# Patient Record
Sex: FEMALE | Race: WHITE | NOT HISPANIC OR LATINO | URBAN - METROPOLITAN AREA
[De-identification: names, ages, dates, MRNs, and addresses within clinical notes are randomized per-mention and may not be internally consistent; named-entity substitution may affect disease eponyms.]

---

## 2023-08-22 ENCOUNTER — HOSPITAL ENCOUNTER (OUTPATIENT)
Dept: DATA CONVERSION | Facility: HOSPITAL | Age: 33
Discharge: HOME | End: 2023-08-22
Payer: COMMERCIAL

## 2023-08-22 DIAGNOSIS — O16.1: ICD-10-CM

## 2023-08-22 DIAGNOSIS — Z11.51 ENCOUNTER FOR SCREENING FOR HUMAN PAPILLOMAVIRUS (HPV): ICD-10-CM

## 2023-08-22 DIAGNOSIS — Z3A.09 9 WEEKS GESTATION OF PREGNANCY (HHS-HCC): ICD-10-CM

## 2023-08-22 DIAGNOSIS — Z34.01 ENCOUNTER FOR SUPERVISION OF NORMAL FIRST PREGNANCY, FIRST TRIMESTER (HHS-HCC): ICD-10-CM

## 2023-08-23 LAB
C TRACH RRNA SPEC QL NAA+PROBE: NORMAL
DRUG SCREEN COMMENT UR-IMP: NORMAL
N GONORRHOEA RRNA SPEC QL NAA+PROBE: NORMAL
SPECIMEN SOURCE: NORMAL

## 2023-08-31 ENCOUNTER — HOSPITAL ENCOUNTER (OUTPATIENT)
Dept: DATA CONVERSION | Facility: HOSPITAL | Age: 33
Discharge: HOME | End: 2023-08-31
Payer: COMMERCIAL

## 2023-08-31 DIAGNOSIS — Z11.51 ENCOUNTER FOR SCREENING FOR HUMAN PAPILLOMAVIRUS (HPV): ICD-10-CM

## 2023-08-31 DIAGNOSIS — Z34.01 ENCOUNTER FOR SUPERVISION OF NORMAL FIRST PREGNANCY, FIRST TRIMESTER (HHS-HCC): ICD-10-CM

## 2023-08-31 DIAGNOSIS — Z13.79 ENCOUNTER FOR OTHER SCREENING FOR GENETIC AND CHROMOSOMAL ANOMALIES: ICD-10-CM

## 2023-08-31 LAB
ABO + RH BLD: NORMAL
BACTERIA SPEC CULT: NORMAL
BLD GP AB SCN SERPL QL: NEGATIVE
DEPRECATED RDW RBC AUTO: 44.2 FL (ref 37–54)
ERYTHROCYTE [DISTWIDTH] IN BLOOD BY AUTOMATED COUNT: 13.9 % (ref 11.7–15)
HBA1C MFR BLD: 5.5 % (ref 4–6)
HBV SURFACE AG SER QL: NEGATIVE
HCT VFR BLD AUTO: 39.6 % (ref 36–44)
HGB BLD-MCNC: 13.3 GM/DL (ref 12–15)
HX OF BLOOD TRANSFUSION: NORMAL
MCH RBC QN AUTO: 29.1 PG (ref 26–34)
MCHC RBC AUTO-ENTMCNC: 33.6 % (ref 31–37)
MCV RBC AUTO: 86.7 FL (ref 80–100)
NRBC BLD-RTO: 0 /100 WBC
PLATELET # BLD AUTO: 184 K/UL (ref 150–450)
PMV BLD AUTO: 10.6 CU (ref 7–12.6)
RBC # BLD AUTO: 4.57 M/UL (ref 4–4.9)
REPORT STATUS -LH SQ DATA CONVERSION: NORMAL
RUBV IGG SER-ACNC: 95.06 IU/ML
SERVICE CMNT-IMP: NORMAL
SPECIMEN SOURCE: NORMAL
TSH SERPL DL<=0.05 MIU/L-ACNC: 1.43 MIU/L (ref 0.27–4.2)
WBC # BLD AUTO: 7 K/UL (ref 4.5–11)

## 2023-09-01 LAB
HCV AB SER QL: NORMAL
HIV 1+2 AB+HIV1 P24 AG SERPL QL IA: NORMAL

## 2023-09-04 LAB — RPR SER QL: NONREACTIVE

## 2023-09-27 ENCOUNTER — HOSPITAL ENCOUNTER (OUTPATIENT)
Dept: DATA CONVERSION | Facility: HOSPITAL | Age: 33
Discharge: HOME | End: 2023-09-27
Payer: COMMERCIAL

## 2023-09-27 DIAGNOSIS — Z34.82 ENCOUNTER FOR SUPERVISION OF OTHER NORMAL PREGNANCY, SECOND TRIMESTER (HHS-HCC): ICD-10-CM

## 2023-09-27 LAB
ALT SERPL-CCNC: 47 U/L (ref 5–40)
AST SERPL-CCNC: 29 U/L (ref 5–40)
BUN SERPL-MCNC: 10 MG/DL (ref 8–25)
BUN/CREAT SERPL: 12.5 RATIO (ref 8–21)
CREAT SERPL-MCNC: 0.8 MG/DL (ref 0.4–1.6)
CREAT UR-MCNC: 254.5 MG/DL
DEPRECATED RDW RBC AUTO: 44.6 FL (ref 37–54)
ERYTHROCYTE [DISTWIDTH] IN BLOOD BY AUTOMATED COUNT: 14.2 % (ref 11.7–15)
GFR SERPL CREATININE-BSD FRML MDRD: 101 ML/MIN/1.73 M2
HCT VFR BLD AUTO: 40.5 % (ref 36–44)
HGB BLD-MCNC: 13.4 GM/DL (ref 12–15)
LDH SERPL-CCNC: 158 U/L (ref 91–227)
MCH RBC QN AUTO: 28.6 PG (ref 26–34)
MCHC RBC AUTO-ENTMCNC: 33.1 % (ref 31–37)
MCV RBC AUTO: 86.4 FL (ref 80–100)
NRBC BLD-RTO: 0 /100 WBC
PLATELET # BLD AUTO: 219 K/UL (ref 150–450)
PMV BLD AUTO: 10.8 CU (ref 7–12.6)
PROT UR-MCNC: 28 MG/DL
PROT/CREAT UR: 0.1 MG/G
RBC # BLD AUTO: 4.69 M/UL (ref 4–4.9)
URATE SERPL-MCNC: 5.4 MG/DL (ref 2.5–6.8)
WBC # BLD AUTO: 11.7 K/UL (ref 4.5–11)

## 2023-12-14 ENCOUNTER — LAB (OUTPATIENT)
Dept: LAB | Facility: LAB | Age: 33
End: 2023-12-14
Payer: COMMERCIAL

## 2023-12-14 DIAGNOSIS — Z34.02 ENCOUNTER FOR SUPERVISION OF NORMAL FIRST PREGNANCY, SECOND TRIMESTER (HHS-HCC): Primary | ICD-10-CM

## 2023-12-14 LAB — GLUCOSE 1H P GLC SERPL-MCNC: 102 MG/DL (ref 65–139)

## 2023-12-14 PROCEDURE — 82947 ASSAY GLUCOSE BLOOD QUANT: CPT

## 2023-12-14 PROCEDURE — 36415 COLL VENOUS BLD VENIPUNCTURE: CPT

## 2023-12-15 ENCOUNTER — LAB (OUTPATIENT)
Dept: LAB | Facility: LAB | Age: 33
End: 2023-12-15
Payer: COMMERCIAL

## 2023-12-15 DIAGNOSIS — O10.919 HTN IN PREGNANCY, CHRONIC (HHS-HCC): Primary | ICD-10-CM

## 2023-12-15 DIAGNOSIS — O10.919 HTN IN PREGNANCY, CHRONIC (HHS-HCC): ICD-10-CM

## 2023-12-15 LAB
ERYTHROCYTE [DISTWIDTH] IN BLOOD BY AUTOMATED COUNT: 14.1 % (ref 11.5–14.5)
HCT VFR BLD AUTO: 37.6 % (ref 36–46)
HGB BLD-MCNC: 11.8 G/DL (ref 12–16)
MCH RBC QN AUTO: 29.1 PG (ref 26–34)
MCHC RBC AUTO-ENTMCNC: 31.4 G/DL (ref 32–36)
MCV RBC AUTO: 93 FL (ref 80–100)
NRBC BLD-RTO: 0 /100 WBCS (ref 0–0)
PLATELET # BLD AUTO: 202 X10*3/UL (ref 150–450)
RBC # BLD AUTO: 4.06 X10*6/UL (ref 4–5.2)
WBC # BLD AUTO: 12.7 X10*3/UL (ref 4.4–11.3)

## 2023-12-15 PROCEDURE — 36415 COLL VENOUS BLD VENIPUNCTURE: CPT

## 2023-12-15 PROCEDURE — 85027 COMPLETE CBC AUTOMATED: CPT

## 2023-12-22 ENCOUNTER — LAB (OUTPATIENT)
Dept: LAB | Facility: LAB | Age: 33
End: 2023-12-22
Payer: COMMERCIAL

## 2023-12-22 DIAGNOSIS — O16.3 UNSPECIFIED MATERNAL HYPERTENSION, THIRD TRIMESTER (HHS-HCC): Primary | ICD-10-CM

## 2023-12-22 LAB
ALT SERPL-CCNC: 56 U/L (ref 5–40)
AST SERPL-CCNC: 40 U/L (ref 5–40)
BUN SERPL-MCNC: 8 MG/DL (ref 8–25)
CREAT SERPL-MCNC: 0.6 MG/DL (ref 0.4–1.6)
CREAT UR-MCNC: 63.6 MG/DL
ERYTHROCYTE [DISTWIDTH] IN BLOOD BY AUTOMATED COUNT: 14.3 % (ref 11.5–14.5)
GFR SERPL CREATININE-BSD FRML MDRD: >90 ML/MIN/1.73M*2
HCT VFR BLD AUTO: 36.9 % (ref 36–46)
HGB BLD-MCNC: 11.7 G/DL (ref 12–16)
LDH SERPL-CCNC: 136 U/L (ref 91–227)
MCH RBC QN AUTO: 29.2 PG (ref 26–34)
MCHC RBC AUTO-ENTMCNC: 31.7 G/DL (ref 32–36)
MCV RBC AUTO: 92 FL (ref 80–100)
NRBC BLD-RTO: 0 /100 WBCS (ref 0–0)
PLATELET # BLD AUTO: 176 X10*3/UL (ref 150–450)
PROT UR-MCNC: 10 MG/DL
PROT/CREAT UR: 0.16 MG/MG CREAT
RBC # BLD AUTO: 4.01 X10*6/UL (ref 4–5.2)
URATE SERPL-MCNC: 4.5 MG/DL (ref 2.5–6.8)
WBC # BLD AUTO: 11.7 X10*3/UL (ref 4.4–11.3)

## 2023-12-22 PROCEDURE — 36415 COLL VENOUS BLD VENIPUNCTURE: CPT

## 2023-12-22 PROCEDURE — 84450 TRANSFERASE (AST) (SGOT): CPT

## 2023-12-22 PROCEDURE — 84156 ASSAY OF PROTEIN URINE: CPT

## 2023-12-22 PROCEDURE — 83615 LACTATE (LD) (LDH) ENZYME: CPT

## 2023-12-22 PROCEDURE — 82570 ASSAY OF URINE CREATININE: CPT

## 2023-12-22 PROCEDURE — 82565 ASSAY OF CREATININE: CPT

## 2023-12-22 PROCEDURE — 85027 COMPLETE CBC AUTOMATED: CPT

## 2023-12-22 PROCEDURE — 84520 ASSAY OF UREA NITROGEN: CPT

## 2023-12-22 PROCEDURE — 84550 ASSAY OF BLOOD/URIC ACID: CPT

## 2023-12-22 PROCEDURE — 84460 ALANINE AMINO (ALT) (SGPT): CPT

## 2024-01-08 ENCOUNTER — LAB (OUTPATIENT)
Dept: LAB | Facility: LAB | Age: 34
End: 2024-01-08
Payer: COMMERCIAL

## 2024-01-08 ENCOUNTER — HOSPITAL ENCOUNTER (OUTPATIENT)
Facility: HOSPITAL | Age: 34
End: 2024-01-08
Attending: OBSTETRICS & GYNECOLOGY | Admitting: OBSTETRICS & GYNECOLOGY
Payer: COMMERCIAL

## 2024-01-08 ENCOUNTER — HOSPITAL ENCOUNTER (OUTPATIENT)
Facility: HOSPITAL | Age: 34
Discharge: HOME | End: 2024-01-08
Attending: OBSTETRICS & GYNECOLOGY | Admitting: OBSTETRICS & GYNECOLOGY
Payer: COMMERCIAL

## 2024-01-08 VITALS
BODY MASS INDEX: 49.46 KG/M2 | DIASTOLIC BLOOD PRESSURE: 57 MMHG | RESPIRATION RATE: 20 BRPM | OXYGEN SATURATION: 98 % | WEIGHT: 289.68 LBS | HEIGHT: 64 IN | SYSTOLIC BLOOD PRESSURE: 98 MMHG | TEMPERATURE: 97.9 F | HEART RATE: 89 BPM

## 2024-01-08 DIAGNOSIS — O16.3 UNSPECIFIED MATERNAL HYPERTENSION, THIRD TRIMESTER (HHS-HCC): Primary | ICD-10-CM

## 2024-01-08 LAB
ALBUMIN SERPL BCP-MCNC: 3.6 G/DL (ref 3.4–5)
ALP SERPL-CCNC: 97 U/L (ref 33–110)
ALT SERPL W P-5'-P-CCNC: 71 U/L (ref 7–45)
ALT SERPL-CCNC: 73 U/L (ref 5–40)
ANION GAP SERPL CALC-SCNC: 15 MMOL/L (ref 10–20)
AST SERPL W P-5'-P-CCNC: 42 U/L (ref 9–39)
AST SERPL-CCNC: 48 U/L (ref 5–40)
BILIRUB SERPL-MCNC: 0.5 MG/DL (ref 0–1.2)
BILIRUBIN, POC: NEGATIVE
BLOOD URINE, POC: NEGATIVE
BUN SERPL-MCNC: 7 MG/DL (ref 6–23)
BUN SERPL-MCNC: 7 MG/DL (ref 8–25)
CALCIUM SERPL-MCNC: 9.8 MG/DL (ref 8.6–10.6)
CHLORIDE SERPL-SCNC: 104 MMOL/L (ref 98–107)
CLARITY, POC: CLEAR
CO2 SERPL-SCNC: 21 MMOL/L (ref 21–32)
COLOR, POC: YELLOW
CREAT SERPL-MCNC: 0.44 MG/DL (ref 0.5–1.05)
CREAT SERPL-MCNC: 0.6 MG/DL (ref 0.4–1.6)
CREAT UR-MCNC: 25.9 MG/DL (ref 20–320)
CREAT UR-MCNC: 36.4 MG/DL
EGFRCR SERPLBLD CKD-EPI 2021: >90 ML/MIN/1.73M*2
EGFRCR SERPLBLD CKD-EPI 2021: >90 ML/MIN/1.73M*2
ERYTHROCYTE [DISTWIDTH] IN BLOOD BY AUTOMATED COUNT: 14.3 % (ref 11.5–14.5)
ERYTHROCYTE [DISTWIDTH] IN BLOOD BY AUTOMATED COUNT: 14.6 % (ref 11.5–14.5)
GLUCOSE SERPL-MCNC: 90 MG/DL (ref 74–99)
GLUCOSE URINE, POC: NEGATIVE
HAV IGM SER QL: NONREACTIVE
HBV CORE IGM SER QL: NONREACTIVE
HBV SURFACE AG SERPL QL IA: NONREACTIVE
HCT VFR BLD AUTO: 35.6 % (ref 36–46)
HCT VFR BLD AUTO: 37.5 % (ref 36–46)
HCV AB SER QL: NONREACTIVE
HGB BLD-MCNC: 11.6 G/DL (ref 12–16)
HGB BLD-MCNC: 11.7 G/DL (ref 12–16)
KETONES, POC: NEGATIVE
LDH SERPL L TO P-CCNC: 122 U/L (ref 84–246)
LEUKOCYTE EST, POC: NEGATIVE
MCH RBC QN AUTO: 29.4 PG (ref 26–34)
MCH RBC QN AUTO: 29.5 PG (ref 26–34)
MCHC RBC AUTO-ENTMCNC: 30.9 G/DL (ref 32–36)
MCHC RBC AUTO-ENTMCNC: 32.9 G/DL (ref 32–36)
MCV RBC AUTO: 90 FL (ref 80–100)
MCV RBC AUTO: 95 FL (ref 80–100)
NITRITE, POC: NEGATIVE
NRBC BLD-RTO: 0 /100 WBCS (ref 0–0)
NRBC BLD-RTO: 0 /100 WBCS (ref 0–0)
PH, POC: 6.5
PLATELET # BLD AUTO: 160 X10*3/UL (ref 150–450)
PLATELET # BLD AUTO: 165 X10*3/UL (ref 150–450)
POTASSIUM SERPL-SCNC: 3.9 MMOL/L (ref 3.5–5.3)
PROT SERPL-MCNC: 6.7 G/DL (ref 6.4–8.2)
PROT UR-ACNC: 7 MG/DL (ref 5–24)
PROT UR-MCNC: 7 MG/DL
PROT/CREAT UR: 0.27 MG/MG CREAT (ref 0–0.17)
PROT/CREAT UR: NORMAL MG/G{CREAT}
RBC # BLD AUTO: 3.95 X10*6/UL (ref 4–5.2)
RBC # BLD AUTO: 3.97 X10*6/UL (ref 4–5.2)
SODIUM SERPL-SCNC: 136 MMOL/L (ref 136–145)
SPECIFIC GRAVITY, POC: 1.01
URATE SERPL-MCNC: 3.9 MG/DL (ref 2.5–6.8)
URATE SERPL-MCNC: 4.1 MG/DL (ref 2.3–6.7)
URINE PROTEIN, POC: NORMAL
UROBILINOGEN, POC: NEGATIVE
WBC # BLD AUTO: 10.4 X10*3/UL (ref 4.4–11.3)
WBC # BLD AUTO: 9.6 X10*3/UL (ref 4.4–11.3)

## 2024-01-08 PROCEDURE — 84156 ASSAY OF PROTEIN URINE: CPT

## 2024-01-08 PROCEDURE — 85027 COMPLETE CBC AUTOMATED: CPT

## 2024-01-08 PROCEDURE — 80074 ACUTE HEPATITIS PANEL: CPT

## 2024-01-08 PROCEDURE — 36415 COLL VENOUS BLD VENIPUNCTURE: CPT

## 2024-01-08 PROCEDURE — 80053 COMPREHEN METABOLIC PANEL: CPT

## 2024-01-08 PROCEDURE — 82239 BILE ACIDS TOTAL: CPT

## 2024-01-08 PROCEDURE — 2500000001 HC RX 250 WO HCPCS SELF ADMINISTERED DRUGS (ALT 637 FOR MEDICARE OP)

## 2024-01-08 PROCEDURE — 99215 OFFICE O/P EST HI 40 MIN: CPT | Mod: 25

## 2024-01-08 PROCEDURE — 84550 ASSAY OF BLOOD/URIC ACID: CPT

## 2024-01-08 PROCEDURE — 81002 URINALYSIS NONAUTO W/O SCOPE: CPT

## 2024-01-08 PROCEDURE — 82565 ASSAY OF CREATININE: CPT

## 2024-01-08 PROCEDURE — 59025 FETAL NON-STRESS TEST: CPT

## 2024-01-08 PROCEDURE — 83615 LACTATE (LD) (LDH) ENZYME: CPT

## 2024-01-08 PROCEDURE — 99213 OFFICE O/P EST LOW 20 MIN: CPT | Mod: 25

## 2024-01-08 PROCEDURE — 99214 OFFICE O/P EST MOD 30 MIN: CPT

## 2024-01-08 RX ORDER — DIPHENHYDRAMINE HCL 25 MG
25 CAPSULE ORAL ONCE
Status: COMPLETED | OUTPATIENT
Start: 2024-01-08 | End: 2024-01-08

## 2024-01-08 RX ORDER — METOCLOPRAMIDE 10 MG/1
10 TABLET ORAL EVERY 6 HOURS PRN
Status: DISCONTINUED | OUTPATIENT
Start: 2024-01-08 | End: 2024-01-08 | Stop reason: HOSPADM

## 2024-01-08 RX ORDER — ONDANSETRON 4 MG/1
4 TABLET, FILM COATED ORAL EVERY 6 HOURS PRN
Status: DISCONTINUED | OUTPATIENT
Start: 2024-01-08 | End: 2024-01-08 | Stop reason: HOSPADM

## 2024-01-08 RX ORDER — DIPHENHYDRAMINE HYDROCHLORIDE 50 MG/ML
25 INJECTION INTRAMUSCULAR; INTRAVENOUS ONCE
Status: COMPLETED | OUTPATIENT
Start: 2024-01-08 | End: 2024-01-08

## 2024-01-08 RX ORDER — METOCLOPRAMIDE HYDROCHLORIDE 5 MG/ML
10 INJECTION INTRAMUSCULAR; INTRAVENOUS EVERY 6 HOURS PRN
Status: DISCONTINUED | OUTPATIENT
Start: 2024-01-08 | End: 2024-01-08 | Stop reason: HOSPADM

## 2024-01-08 RX ORDER — ONDANSETRON HYDROCHLORIDE 2 MG/ML
4 INJECTION, SOLUTION INTRAVENOUS EVERY 6 HOURS PRN
Status: DISCONTINUED | OUTPATIENT
Start: 2024-01-08 | End: 2024-01-08 | Stop reason: HOSPADM

## 2024-01-08 RX ORDER — ACETAMINOPHEN 325 MG/1
975 TABLET ORAL ONCE
Status: COMPLETED | OUTPATIENT
Start: 2024-01-08 | End: 2024-01-08

## 2024-01-08 RX ADMIN — ACETAMINOPHEN 975 MG: 325 TABLET ORAL at 18:54

## 2024-01-08 RX ADMIN — DIPHENHYDRAMINE HYDROCHLORIDE 25 MG: 25 CAPSULE ORAL at 18:54

## 2024-01-08 ASSESSMENT — PAIN SCALES - GENERAL: PAINLEVEL_OUTOF10: 0 - NO PAIN

## 2024-01-09 NOTE — H&P
Obstetrical Admission History and Physical  Triage   Nory Rao is a 33 y.o.  at 28w6d     Chief Complaint: Headache, Elevated LFTs, and Visual Field Change    Assessment/Plan    Elevated LFTs  - ALT: 56 () --> 73 () --> 71 ()  - AST: 40 () --> 48 () --> 42 ()  - Stable on repeat 6hr draw today  - CBC unremarkable, P:C 0.27, Bps normotensive, unlikely siPEC at this time  - Denies RUQ pain or previous liver etiologies   - Denies excessive OTC use, x1 dose of 1g tylenol one day ago  - Hepatitis B and C WNL on prenatal panel  - Hepatitis panel pending  - Notes increased intense itching on soles and palms last few days  - Bile acids pending  - Will follow-up results    cHTN  - BPs normotensive in triage  - Dull GIRON, improved with tylenol, reglan, benadryl  - HELLP labs notable for elevated LFTs (see above)  - P:C 0.27  - On labetalol 200mg BID  - Continue to take BPs at home  - Reviewed signs elevated BP, appropriate BP parameters and how to monitor BP at home   - Low suspicion at this time for developing HELLP syndrome given normotensive Bps and otherwise unremarkable labwork    IUP at 28w6d   - NST appropriate for gestational age   - Variable decel noted, cat I on prolonged monitoring >2hrs  - Good fetal movement  - Continue routine prenatal care  - Precautions to return discussed     Maternal Well-being  - Vital signs stable and WNL  - All questions and concerns addressed     Plan and tracing reviewed with Dr. Valera.  Patient safe and stable for d/c home.     Caitlyn Guan, JORGE ALBERTO-CNP     Active Problems:  There are no active Hospital Problems.      Pregnancy Problems (from 26 to present)       No problems associated with this episode.          Subjective   Nory is here for elevated liver enzymes.  Notes that lab work revealed elevated LFTs prior to paty.  Today were increasing.  Patient denies RUQ pain, nausea, vomiting.  Reports mild headache, 3/10.  Took tylenol  yesterday with benadryl and got no relief.  Reports occasional floater in eyes unilaterally while driving.  Denies history of liver etiologies including fatty liver, hepatomegaly, or hepatitis.  Denies excessive OTC drug intake.  Denies taking any medications during the pregnancy.  Reports new onset URI symptoms that started two days ago.  Endorses itching of the soles of her feet and palms of her hands that started a few days ago as well.  Itching it worse at night.     Obstetrical History   OB History    Para Term  AB Living   1             SAB IAB Ectopic Multiple Live Births                  # Outcome Date GA Lbr Reji/2nd Weight Sex Delivery Anes PTL Lv   1 Current                Past Medical History  No past medical history on file.     Past Surgical History   No past surgical history on file.    Social History  Social History     Tobacco Use    Smoking status: Not on file    Smokeless tobacco: Not on file   Substance Use Topics    Alcohol use: Not on file     Substance and Sexual Activity   Drug Use Not on file       Allergies  Patient has no known allergies.     Medications  No medications prior to admission.       Objective    Last Vitals  Temp Pulse Resp BP MAP O2 Sat   36.6 °C (97.9 °F) 89 20 98/57   98 %     Physical Examination  Physical Exam  Exam conducted with a chaperone present.   Constitutional:       Appearance: Normal appearance.   HENT:      Head: Normocephalic.   Cardiovascular:      Rate and Rhythm: Normal rate.      Pulses: Normal pulses.   Pulmonary:      Effort: Pulmonary effort is normal.   Abdominal:      General: Abdomen is flat.      Palpations: Abdomen is soft.      Comments: Gravid   Genitourinary:     Comments: FHT: 145, mod variability, +accels, variable decel noted  [place]: quiet  Musculoskeletal:         General: Normal range of motion.   Skin:     General: Skin is warm.   Neurological:      Mental Status: She is alert and oriented to person, place, and time.    Psychiatric:         Mood and Affect: Mood normal.         Behavior: Behavior normal.        Lab Review  Lab Results   Component Value Date    WBC 10.4 01/08/2024    HGB 11.7 (L) 01/08/2024    HCT 35.6 (L) 01/08/2024     01/08/2024     Lab Results   Component Value Date    GLUCOSE 90 01/08/2024     01/08/2024    K 3.9 01/08/2024     01/08/2024    CO2 21 01/08/2024    ANIONGAP 15 01/08/2024    BUN 7 01/08/2024    CREATININE 0.44 (L) 01/08/2024    EGFR >90 01/08/2024    CALCIUM 9.8 01/08/2024    ALBUMIN 3.6 01/08/2024    PROT 6.7 01/08/2024    ALKPHOS 97 01/08/2024    ALT 71 (H) 01/08/2024    AST 42 (H) 01/08/2024    BILITOT 0.5 01/08/2024     Lab Results   Component Value Date    UTPCR 0.27 (H) 01/08/2024

## 2024-01-10 LAB — BILE AC SERPL-SCNC: 7 UMOL/L (ref 0–10)

## 2024-01-30 ENCOUNTER — LAB (OUTPATIENT)
Dept: LAB | Facility: LAB | Age: 34
End: 2024-01-30
Payer: COMMERCIAL

## 2024-01-30 DIAGNOSIS — O16.3 UNSPECIFIED MATERNAL HYPERTENSION, THIRD TRIMESTER (HHS-HCC): Primary | ICD-10-CM

## 2024-01-30 LAB
ALT SERPL-CCNC: 52 U/L (ref 5–40)
AST SERPL-CCNC: 39 U/L (ref 5–40)
BUN SERPL-MCNC: 8 MG/DL (ref 8–25)
CREAT SERPL-MCNC: 0.6 MG/DL (ref 0.4–1.6)
CREAT UR-MCNC: 74.9 MG/DL
EGFRCR SERPLBLD CKD-EPI 2021: >90 ML/MIN/1.73M*2
ERYTHROCYTE [DISTWIDTH] IN BLOOD BY AUTOMATED COUNT: 14.2 % (ref 11.5–14.5)
HCT VFR BLD AUTO: 38.4 % (ref 36–46)
HGB BLD-MCNC: 12.4 G/DL (ref 12–16)
MCH RBC QN AUTO: 28.9 PG (ref 26–34)
MCHC RBC AUTO-ENTMCNC: 32.3 G/DL (ref 32–36)
MCV RBC AUTO: 90 FL (ref 80–100)
NRBC BLD-RTO: 0 /100 WBCS (ref 0–0)
PLATELET # BLD AUTO: 171 X10*3/UL (ref 150–450)
PROT UR-MCNC: 13 MG/DL
PROT/CREAT UR: 0.17 MG/MG CREAT
RBC # BLD AUTO: 4.29 X10*6/UL (ref 4–5.2)
URATE SERPL-MCNC: 4.4 MG/DL (ref 2.5–6.8)
WBC # BLD AUTO: 10.6 X10*3/UL (ref 4.4–11.3)

## 2024-01-30 PROCEDURE — 84450 TRANSFERASE (AST) (SGOT): CPT

## 2024-01-30 PROCEDURE — 82565 ASSAY OF CREATININE: CPT

## 2024-01-30 PROCEDURE — 85027 COMPLETE CBC AUTOMATED: CPT

## 2024-01-30 PROCEDURE — 84550 ASSAY OF BLOOD/URIC ACID: CPT

## 2024-01-30 PROCEDURE — 82570 ASSAY OF URINE CREATININE: CPT

## 2024-01-30 PROCEDURE — 84156 ASSAY OF PROTEIN URINE: CPT

## 2024-01-30 PROCEDURE — 36415 COLL VENOUS BLD VENIPUNCTURE: CPT

## 2024-01-30 PROCEDURE — 84460 ALANINE AMINO (ALT) (SGPT): CPT

## 2024-01-30 PROCEDURE — 84520 ASSAY OF UREA NITROGEN: CPT

## 2024-02-07 ENCOUNTER — LAB (OUTPATIENT)
Dept: LAB | Facility: LAB | Age: 34
End: 2024-02-07
Payer: COMMERCIAL

## 2024-02-07 DIAGNOSIS — Z34.83 ENCOUNTER FOR SUPERVISION OF OTHER NORMAL PREGNANCY, THIRD TRIMESTER (HHS-HCC): Primary | ICD-10-CM

## 2024-02-07 DIAGNOSIS — O12.03 GESTATIONAL EDEMA, THIRD TRIMESTER (HHS-HCC): ICD-10-CM

## 2024-02-07 DIAGNOSIS — L29.9 PRURITUS, UNSPECIFIED: ICD-10-CM

## 2024-02-07 DIAGNOSIS — I10 ESSENTIAL (PRIMARY) HYPERTENSION: ICD-10-CM

## 2024-02-07 LAB
ALT SERPL-CCNC: 52 U/L (ref 5–40)
AST SERPL-CCNC: 39 U/L (ref 5–40)
BUN SERPL-MCNC: 8 MG/DL (ref 8–25)
CREAT SERPL-MCNC: 0.7 MG/DL (ref 0.4–1.6)
CREAT UR-MCNC: 149.9 MG/DL
EGFRCR SERPLBLD CKD-EPI 2021: >90 ML/MIN/1.73M*2
ERYTHROCYTE [DISTWIDTH] IN BLOOD BY AUTOMATED COUNT: 14.5 % (ref 11.5–14.5)
HCT VFR BLD AUTO: 37 % (ref 36–46)
HGB BLD-MCNC: 12.2 G/DL (ref 12–16)
LDH SERPL-CCNC: 158 U/L (ref 91–227)
MCH RBC QN AUTO: 28.8 PG (ref 26–34)
MCHC RBC AUTO-ENTMCNC: 33 G/DL (ref 32–36)
MCV RBC AUTO: 87 FL (ref 80–100)
NRBC BLD-RTO: 0.2 /100 WBCS (ref 0–0)
PLATELET # BLD AUTO: 158 X10*3/UL (ref 150–450)
PROT UR-MCNC: 34 MG/DL
PROT/CREAT UR: 0.23 MG/MG CREAT
RBC # BLD AUTO: 4.24 X10*6/UL (ref 4–5.2)
URATE SERPL-MCNC: 6.2 MG/DL (ref 2.5–6.8)
WBC # BLD AUTO: 8.5 X10*3/UL (ref 4.4–11.3)

## 2024-02-07 PROCEDURE — 84550 ASSAY OF BLOOD/URIC ACID: CPT

## 2024-02-07 PROCEDURE — 84460 ALANINE AMINO (ALT) (SGPT): CPT

## 2024-02-07 PROCEDURE — 84156 ASSAY OF PROTEIN URINE: CPT

## 2024-02-07 PROCEDURE — 36415 COLL VENOUS BLD VENIPUNCTURE: CPT

## 2024-02-07 PROCEDURE — 82570 ASSAY OF URINE CREATININE: CPT

## 2024-02-07 PROCEDURE — 84450 TRANSFERASE (AST) (SGOT): CPT

## 2024-02-07 PROCEDURE — 82565 ASSAY OF CREATININE: CPT

## 2024-02-07 PROCEDURE — 83615 LACTATE (LD) (LDH) ENZYME: CPT

## 2024-02-07 PROCEDURE — 84520 ASSAY OF UREA NITROGEN: CPT

## 2024-02-07 PROCEDURE — 85027 COMPLETE CBC AUTOMATED: CPT

## 2024-02-09 ENCOUNTER — HOSPITAL ENCOUNTER (OUTPATIENT)
Facility: HOSPITAL | Age: 34
Discharge: ANOTHER HOSPITAL | End: 2024-02-09
Attending: OBSTETRICS & GYNECOLOGY | Admitting: OBSTETRICS & GYNECOLOGY
Payer: COMMERCIAL

## 2024-02-09 VITALS
DIASTOLIC BLOOD PRESSURE: 74 MMHG | RESPIRATION RATE: 18 BRPM | SYSTOLIC BLOOD PRESSURE: 149 MMHG | WEIGHT: 289.68 LBS | HEIGHT: 64 IN | HEART RATE: 78 BPM | OXYGEN SATURATION: 96 % | TEMPERATURE: 98.1 F | BODY MASS INDEX: 49.46 KG/M2

## 2024-02-09 LAB
ALBUMIN SERPL-MCNC: 3.2 G/DL (ref 3.5–5)
ALP BLD-CCNC: 132 U/L (ref 35–125)
ALT SERPL-CCNC: 53 U/L (ref 5–40)
ANION GAP SERPL CALC-SCNC: 12 MMOL/L
AST SERPL-CCNC: 45 U/L (ref 5–40)
BILIRUB SERPL-MCNC: 0.4 MG/DL (ref 0.1–1.2)
BILIRUBIN, POC: NEGATIVE
BLOOD URINE, POC: NEGATIVE
BUN SERPL-MCNC: 6 MG/DL (ref 8–25)
CALCIUM SERPL-MCNC: 9.6 MG/DL (ref 8.5–10.4)
CHLORIDE SERPL-SCNC: 104 MMOL/L (ref 97–107)
CLARITY, POC: CLEAR
CO2 SERPL-SCNC: 20 MMOL/L (ref 24–31)
COLOR, POC: YELLOW
CREAT SERPL-MCNC: 0.6 MG/DL (ref 0.4–1.6)
CREAT UR-MCNC: 21.5 MG/DL
EGFRCR SERPLBLD CKD-EPI 2021: >90 ML/MIN/1.73M*2
ERYTHROCYTE [DISTWIDTH] IN BLOOD BY AUTOMATED COUNT: 14.4 % (ref 11.5–14.5)
GLUCOSE SERPL-MCNC: 109 MG/DL (ref 65–99)
GLUCOSE URINE, POC: NORMAL
HCT VFR BLD AUTO: 33.9 % (ref 36–46)
HGB BLD-MCNC: 11.3 G/DL (ref 12–16)
KETONES, POC: NEGATIVE
LEUKOCYTE EST, POC: NEGATIVE
MCH RBC QN AUTO: 29.4 PG (ref 26–34)
MCHC RBC AUTO-ENTMCNC: 33.3 G/DL (ref 32–36)
MCV RBC AUTO: 88 FL (ref 80–100)
NITRITE, POC: NEGATIVE
NRBC BLD-RTO: 0.2 /100 WBCS (ref 0–0)
PH, POC: 6
PLATELET # BLD AUTO: 144 X10*3/UL (ref 150–450)
POC APPEARANCE OF BODY FLUID: CLEAR
POTASSIUM SERPL-SCNC: 3.9 MMOL/L (ref 3.4–5.1)
PROT SERPL-MCNC: 6.5 G/DL (ref 5.9–7.9)
PROT UR-MCNC: 6 MG/DL
PROT/CREAT UR: NORMAL MG/G{CREAT}
RBC # BLD AUTO: 3.84 X10*6/UL (ref 4–5.2)
SODIUM SERPL-SCNC: 136 MMOL/L (ref 133–145)
SPECIFIC GRAVITY, POC: 1
URATE SERPL-MCNC: 5.1 MG/DL (ref 2.5–6.8)
URINE PROTEIN, POC: NEGATIVE
UROBILINOGEN, POC: NORMAL
WBC # BLD AUTO: 9.6 X10*3/UL (ref 4.4–11.3)

## 2024-02-09 PROCEDURE — 2500000001 HC RX 250 WO HCPCS SELF ADMINISTERED DRUGS (ALT 637 FOR MEDICARE OP): Performed by: OBSTETRICS & GYNECOLOGY

## 2024-02-09 PROCEDURE — 83615 LACTATE (LD) (LDH) ENZYME: CPT | Performed by: OBSTETRICS & GYNECOLOGY

## 2024-02-09 PROCEDURE — 81002 URINALYSIS NONAUTO W/O SCOPE: CPT | Performed by: OBSTETRICS & GYNECOLOGY

## 2024-02-09 PROCEDURE — 85027 COMPLETE CBC AUTOMATED: CPT | Performed by: OBSTETRICS & GYNECOLOGY

## 2024-02-09 PROCEDURE — 36415 COLL VENOUS BLD VENIPUNCTURE: CPT

## 2024-02-09 PROCEDURE — 36415 COLL VENOUS BLD VENIPUNCTURE: CPT | Performed by: OBSTETRICS & GYNECOLOGY

## 2024-02-09 PROCEDURE — 84550 ASSAY OF BLOOD/URIC ACID: CPT | Performed by: OBSTETRICS & GYNECOLOGY

## 2024-02-09 PROCEDURE — 82570 ASSAY OF URINE CREATININE: CPT | Performed by: OBSTETRICS & GYNECOLOGY

## 2024-02-09 PROCEDURE — 99215 OFFICE O/P EST HI 40 MIN: CPT | Mod: 25,27

## 2024-02-09 PROCEDURE — 96372 THER/PROPH/DIAG INJ SC/IM: CPT

## 2024-02-09 PROCEDURE — 2500000004 HC RX 250 GENERAL PHARMACY W/ HCPCS (ALT 636 FOR OP/ED): Performed by: OBSTETRICS & GYNECOLOGY

## 2024-02-09 PROCEDURE — 80053 COMPREHEN METABOLIC PANEL: CPT | Performed by: OBSTETRICS & GYNECOLOGY

## 2024-02-09 RX ORDER — ACETAMINOPHEN 325 MG/1
975 TABLET ORAL EVERY 6 HOURS PRN
Status: DISCONTINUED | OUTPATIENT
Start: 2024-02-09 | End: 2024-02-10 | Stop reason: HOSPADM

## 2024-02-09 RX ORDER — LABETALOL 200 MG/1
200 TABLET, FILM COATED ORAL ONCE
COMMUNITY
End: 2024-02-16 | Stop reason: HOSPADM

## 2024-02-09 RX ORDER — LABETALOL 100 MG/1
400 TABLET, FILM COATED ORAL 2 TIMES DAILY
Status: DISCONTINUED | OUTPATIENT
Start: 2024-02-09 | End: 2024-02-10 | Stop reason: HOSPADM

## 2024-02-09 RX ORDER — HYDRALAZINE HYDROCHLORIDE 20 MG/ML
5 INJECTION INTRAMUSCULAR; INTRAVENOUS ONCE AS NEEDED
Status: DISCONTINUED | OUTPATIENT
Start: 2024-02-09 | End: 2024-02-10 | Stop reason: HOSPADM

## 2024-02-09 RX ORDER — LABETALOL HYDROCHLORIDE 5 MG/ML
20 INJECTION, SOLUTION INTRAVENOUS ONCE AS NEEDED
Status: COMPLETED | OUTPATIENT
Start: 2024-02-09 | End: 2024-02-09

## 2024-02-09 RX ORDER — CALCIUM GLUCONATE 98 MG/ML
1 INJECTION, SOLUTION INTRAVENOUS ONCE AS NEEDED
Status: DISCONTINUED | OUTPATIENT
Start: 2024-02-09 | End: 2024-02-10 | Stop reason: HOSPADM

## 2024-02-09 RX ORDER — LIDOCAINE HYDROCHLORIDE 10 MG/ML
0.5 INJECTION INFILTRATION; PERINEURAL ONCE AS NEEDED
Status: DISCONTINUED | OUTPATIENT
Start: 2024-02-09 | End: 2024-02-10 | Stop reason: HOSPADM

## 2024-02-09 RX ORDER — ONDANSETRON HYDROCHLORIDE 2 MG/ML
4 INJECTION, SOLUTION INTRAVENOUS ONCE
Status: COMPLETED | OUTPATIENT
Start: 2024-02-09 | End: 2024-02-09

## 2024-02-09 RX ORDER — NIFEDIPINE 10 MG/1
10 CAPSULE ORAL ONCE AS NEEDED
Status: DISCONTINUED | OUTPATIENT
Start: 2024-02-09 | End: 2024-02-10 | Stop reason: HOSPADM

## 2024-02-09 RX ORDER — MAGNESIUM SULFATE HEPTAHYDRATE 40 MG/ML
2 INJECTION, SOLUTION INTRAVENOUS CONTINUOUS
Status: DISCONTINUED | OUTPATIENT
Start: 2024-02-09 | End: 2024-02-10 | Stop reason: HOSPADM

## 2024-02-09 RX ORDER — BETAMETHASONE SODIUM PHOSPHATE AND BETAMETHASONE ACETATE 3; 3 MG/ML; MG/ML
12 INJECTION, SUSPENSION INTRA-ARTICULAR; INTRALESIONAL; INTRAMUSCULAR; SOFT TISSUE ONCE
Status: COMPLETED | OUTPATIENT
Start: 2024-02-09 | End: 2024-02-09

## 2024-02-09 RX ORDER — LABETALOL 200 MG/1
400 TABLET, FILM COATED ORAL 2 TIMES DAILY
COMMUNITY
End: 2024-02-16 | Stop reason: HOSPADM

## 2024-02-09 RX ADMIN — LABETALOL HYDROCHLORIDE 20 MG: 5 INJECTION INTRAVENOUS at 22:00

## 2024-02-09 RX ADMIN — LABETALOL HYDROCHLORIDE 400 MG: 100 TABLET, FILM COATED ORAL at 20:21

## 2024-02-09 RX ADMIN — ACETAMINOPHEN 975 MG: 325 TABLET ORAL at 20:21

## 2024-02-09 RX ADMIN — ONDANSETRON 4 MG: 2 INJECTION INTRAMUSCULAR; INTRAVENOUS at 23:10

## 2024-02-09 RX ADMIN — MAGNESIUM SULFATE HEPTAHYDRATE 2 G/HR: 40 INJECTION, SOLUTION INTRAVENOUS at 22:17

## 2024-02-09 RX ADMIN — BETAMETHASONE SODIUM PHOSPHATE AND BETAMETHASONE ACETATE 12 MG: 3; 3 INJECTION, SUSPENSION INTRA-ARTICULAR; INTRALESIONAL; INTRAMUSCULAR at 22:10

## 2024-02-09 ASSESSMENT — PAIN SCALES - GENERAL
PAINLEVEL_OUTOF10: 0 - NO PAIN
PAINLEVEL_OUTOF10: 2
PAINLEVEL_OUTOF10: 4

## 2024-02-09 ASSESSMENT — PAIN - FUNCTIONAL ASSESSMENT: PAIN_FUNCTIONAL_ASSESSMENT: 0-10

## 2024-02-09 ASSESSMENT — PAIN DESCRIPTION - LOCATION: LOCATION: HEAD

## 2024-02-10 ENCOUNTER — HOSPITAL ENCOUNTER (INPATIENT)
Facility: HOSPITAL | Age: 34
LOS: 6 days | Discharge: HOME | End: 2024-02-16
Attending: OBSTETRICS & GYNECOLOGY | Admitting: OBSTETRICS & GYNECOLOGY
Payer: COMMERCIAL

## 2024-02-10 LAB
ABO GROUP (TYPE) IN BLOOD: NORMAL
ALBUMIN SERPL BCP-MCNC: 3.6 G/DL (ref 3.4–5)
ALP SERPL-CCNC: 127 U/L (ref 33–110)
ALT SERPL W P-5'-P-CCNC: 52 U/L (ref 7–45)
ANION GAP SERPL CALC-SCNC: 17 MMOL/L (ref 10–20)
ANTIBODY SCREEN: NORMAL
AST SERPL W P-5'-P-CCNC: 40 U/L (ref 9–39)
BILIRUB SERPL-MCNC: 0.5 MG/DL (ref 0–1.2)
BUN SERPL-MCNC: 7 MG/DL (ref 6–23)
CALCIUM SERPL-MCNC: 9.5 MG/DL (ref 8.6–10.6)
CHLORIDE SERPL-SCNC: 106 MMOL/L (ref 98–107)
CO2 SERPL-SCNC: 19 MMOL/L (ref 21–32)
CREAT SERPL-MCNC: 0.62 MG/DL (ref 0.5–1.05)
EGFRCR SERPLBLD CKD-EPI 2021: >90 ML/MIN/1.73M*2
ERYTHROCYTE [DISTWIDTH] IN BLOOD BY AUTOMATED COUNT: 14.4 % (ref 11.5–14.5)
ERYTHROCYTE [DISTWIDTH] IN BLOOD BY AUTOMATED COUNT: 14.6 % (ref 11.5–14.5)
GLUCOSE SERPL-MCNC: 143 MG/DL (ref 74–99)
HCT VFR BLD AUTO: 31.3 % (ref 36–46)
HCT VFR BLD AUTO: 34.9 % (ref 36–46)
HGB BLD-MCNC: 10.3 G/DL (ref 12–16)
HGB BLD-MCNC: 11.3 G/DL (ref 12–16)
LDH SERPL-CCNC: 263 U/L (ref 91–227)
MCH RBC QN AUTO: 29 PG (ref 26–34)
MCH RBC QN AUTO: 29.6 PG (ref 26–34)
MCHC RBC AUTO-ENTMCNC: 32.4 G/DL (ref 32–36)
MCHC RBC AUTO-ENTMCNC: 32.9 G/DL (ref 32–36)
MCV RBC AUTO: 90 FL (ref 80–100)
MCV RBC AUTO: 90 FL (ref 80–100)
NRBC BLD-RTO: 0.2 /100 WBCS (ref 0–0)
NRBC BLD-RTO: 0.3 /100 WBCS (ref 0–0)
PLATELET # BLD AUTO: 143 X10*3/UL (ref 150–450)
PLATELET # BLD AUTO: 147 X10*3/UL (ref 150–450)
POTASSIUM SERPL-SCNC: 4.2 MMOL/L (ref 3.5–5.3)
PROT SERPL-MCNC: 6.3 G/DL (ref 6.4–8.2)
RBC # BLD AUTO: 3.48 X10*6/UL (ref 4–5.2)
RBC # BLD AUTO: 3.89 X10*6/UL (ref 4–5.2)
RH FACTOR (ANTIGEN D): NORMAL
SODIUM SERPL-SCNC: 138 MMOL/L (ref 136–145)
TREPONEMA PALLIDUM IGG+IGM AB [PRESENCE] IN SERUM OR PLASMA BY IMMUNOASSAY: NONREACTIVE
WBC # BLD AUTO: 10.8 X10*3/UL (ref 4.4–11.3)
WBC # BLD AUTO: 8.6 X10*3/UL (ref 4.4–11.3)

## 2024-02-10 PROCEDURE — 80053 COMPREHEN METABOLIC PANEL: CPT | Performed by: STUDENT IN AN ORGANIZED HEALTH CARE EDUCATION/TRAINING PROGRAM

## 2024-02-10 PROCEDURE — 86920 COMPATIBILITY TEST SPIN: CPT

## 2024-02-10 PROCEDURE — 99222 1ST HOSP IP/OBS MODERATE 55: CPT | Performed by: STUDENT IN AN ORGANIZED HEALTH CARE EDUCATION/TRAINING PROGRAM

## 2024-02-10 PROCEDURE — 86780 TREPONEMA PALLIDUM: CPT | Performed by: STUDENT IN AN ORGANIZED HEALTH CARE EDUCATION/TRAINING PROGRAM

## 2024-02-10 PROCEDURE — 2500000004 HC RX 250 GENERAL PHARMACY W/ HCPCS (ALT 636 FOR OP/ED): Performed by: STUDENT IN AN ORGANIZED HEALTH CARE EDUCATION/TRAINING PROGRAM

## 2024-02-10 PROCEDURE — 59025 FETAL NON-STRESS TEST: CPT | Mod: GC | Performed by: STUDENT IN AN ORGANIZED HEALTH CARE EDUCATION/TRAINING PROGRAM

## 2024-02-10 PROCEDURE — 85027 COMPLETE CBC AUTOMATED: CPT | Performed by: STUDENT IN AN ORGANIZED HEALTH CARE EDUCATION/TRAINING PROGRAM

## 2024-02-10 PROCEDURE — 1210000001 HC SEMI-PRIVATE ROOM DAILY

## 2024-02-10 PROCEDURE — 36415 COLL VENOUS BLD VENIPUNCTURE: CPT | Performed by: STUDENT IN AN ORGANIZED HEALTH CARE EDUCATION/TRAINING PROGRAM

## 2024-02-10 PROCEDURE — 86901 BLOOD TYPING SEROLOGIC RH(D): CPT | Performed by: STUDENT IN AN ORGANIZED HEALTH CARE EDUCATION/TRAINING PROGRAM

## 2024-02-10 PROCEDURE — 87081 CULTURE SCREEN ONLY: CPT | Performed by: STUDENT IN AN ORGANIZED HEALTH CARE EDUCATION/TRAINING PROGRAM

## 2024-02-10 PROCEDURE — 2500000001 HC RX 250 WO HCPCS SELF ADMINISTERED DRUGS (ALT 637 FOR MEDICARE OP)

## 2024-02-10 PROCEDURE — 2500000001 HC RX 250 WO HCPCS SELF ADMINISTERED DRUGS (ALT 637 FOR MEDICARE OP): Performed by: STUDENT IN AN ORGANIZED HEALTH CARE EDUCATION/TRAINING PROGRAM

## 2024-02-10 PROCEDURE — 2500000004 HC RX 250 GENERAL PHARMACY W/ HCPCS (ALT 636 FOR OP/ED)

## 2024-02-10 PROCEDURE — 59025 FETAL NON-STRESS TEST: CPT | Performed by: STUDENT IN AN ORGANIZED HEALTH CARE EDUCATION/TRAINING PROGRAM

## 2024-02-10 RX ORDER — CARBOPROST TROMETHAMINE 250 UG/ML
250 INJECTION, SOLUTION INTRAMUSCULAR ONCE AS NEEDED
Status: DISCONTINUED | OUTPATIENT
Start: 2024-02-10 | End: 2024-02-10 | Stop reason: HOSPADM

## 2024-02-10 RX ORDER — MISOPROSTOL 200 UG/1
800 TABLET ORAL ONCE AS NEEDED
Status: DISCONTINUED | OUTPATIENT
Start: 2024-02-10 | End: 2024-02-10 | Stop reason: HOSPADM

## 2024-02-10 RX ORDER — LIDOCAINE HYDROCHLORIDE 10 MG/ML
30 INJECTION INFILTRATION; PERINEURAL ONCE AS NEEDED
Status: DISCONTINUED | OUTPATIENT
Start: 2024-02-10 | End: 2024-02-10 | Stop reason: HOSPADM

## 2024-02-10 RX ORDER — HYDRALAZINE HYDROCHLORIDE 20 MG/ML
5 INJECTION INTRAMUSCULAR; INTRAVENOUS ONCE AS NEEDED
Status: DISCONTINUED | OUTPATIENT
Start: 2024-02-10 | End: 2024-02-10 | Stop reason: HOSPADM

## 2024-02-10 RX ORDER — SODIUM CHLORIDE, SODIUM LACTATE, POTASSIUM CHLORIDE, CALCIUM CHLORIDE 600; 310; 30; 20 MG/100ML; MG/100ML; MG/100ML; MG/100ML
75 INJECTION, SOLUTION INTRAVENOUS CONTINUOUS
Status: DISCONTINUED | OUTPATIENT
Start: 2024-02-10 | End: 2024-02-14

## 2024-02-10 RX ORDER — METOCLOPRAMIDE 10 MG/1
10 TABLET ORAL EVERY 6 HOURS PRN
Status: DISCONTINUED | OUTPATIENT
Start: 2024-02-10 | End: 2024-02-11 | Stop reason: SDUPTHER

## 2024-02-10 RX ORDER — ACETAMINOPHEN 325 MG/1
650 TABLET ORAL EVERY 4 HOURS PRN
Status: DISCONTINUED | OUTPATIENT
Start: 2024-02-10 | End: 2024-02-14

## 2024-02-10 RX ORDER — MAGNESIUM SULFATE HEPTAHYDRATE 40 MG/ML
2 INJECTION, SOLUTION INTRAVENOUS CONTINUOUS
Status: DISCONTINUED | OUTPATIENT
Start: 2024-02-10 | End: 2024-02-14

## 2024-02-10 RX ORDER — ONDANSETRON HYDROCHLORIDE 2 MG/ML
4 INJECTION, SOLUTION INTRAVENOUS EVERY 6 HOURS PRN
Status: DISCONTINUED | OUTPATIENT
Start: 2024-02-10 | End: 2024-02-14

## 2024-02-10 RX ORDER — ONDANSETRON 4 MG/1
4 TABLET, FILM COATED ORAL EVERY 6 HOURS PRN
Status: DISCONTINUED | OUTPATIENT
Start: 2024-02-10 | End: 2024-02-14

## 2024-02-10 RX ORDER — METOCLOPRAMIDE HYDROCHLORIDE 5 MG/ML
10 INJECTION INTRAMUSCULAR; INTRAVENOUS EVERY 6 HOURS PRN
Status: DISCONTINUED | OUTPATIENT
Start: 2024-02-10 | End: 2024-02-11 | Stop reason: SDUPTHER

## 2024-02-10 RX ORDER — TRANEXAMIC ACID 100 MG/ML
1000 INJECTION, SOLUTION INTRAVENOUS ONCE AS NEEDED
Status: DISCONTINUED | OUTPATIENT
Start: 2024-02-10 | End: 2024-02-10 | Stop reason: HOSPADM

## 2024-02-10 RX ORDER — OXYTOCIN 10 [USP'U]/ML
10 INJECTION, SOLUTION INTRAMUSCULAR; INTRAVENOUS ONCE AS NEEDED
Status: DISCONTINUED | OUTPATIENT
Start: 2024-02-10 | End: 2024-02-10 | Stop reason: HOSPADM

## 2024-02-10 RX ORDER — DIPHENHYDRAMINE HYDROCHLORIDE 50 MG/ML
25 INJECTION INTRAMUSCULAR; INTRAVENOUS ONCE
Status: COMPLETED | OUTPATIENT
Start: 2024-02-10 | End: 2024-02-12

## 2024-02-10 RX ORDER — LABETALOL 200 MG/1
400 TABLET, FILM COATED ORAL 2 TIMES DAILY
Status: DISCONTINUED | OUTPATIENT
Start: 2024-02-10 | End: 2024-02-12

## 2024-02-10 RX ORDER — BETAMETHASONE SODIUM PHOSPHATE AND BETAMETHASONE ACETATE 3; 3 MG/ML; MG/ML
12 INJECTION, SUSPENSION INTRA-ARTICULAR; INTRALESIONAL; INTRAMUSCULAR; SOFT TISSUE ONCE
Status: DISCONTINUED | OUTPATIENT
Start: 2024-02-10 | End: 2024-02-10

## 2024-02-10 RX ORDER — DIPHENHYDRAMINE HYDROCHLORIDE 50 MG/ML
25 INJECTION INTRAMUSCULAR; INTRAVENOUS EVERY 6 HOURS PRN
Status: DISCONTINUED | OUTPATIENT
Start: 2024-02-10 | End: 2024-02-14

## 2024-02-10 RX ORDER — METOCLOPRAMIDE HYDROCHLORIDE 5 MG/ML
10 INJECTION INTRAMUSCULAR; INTRAVENOUS ONCE
Status: DISCONTINUED | OUTPATIENT
Start: 2024-02-10 | End: 2024-02-14

## 2024-02-10 RX ORDER — METHYLERGONOVINE MALEATE 0.2 MG/ML
0.2 INJECTION INTRAVENOUS ONCE AS NEEDED
Status: DISCONTINUED | OUTPATIENT
Start: 2024-02-10 | End: 2024-02-10 | Stop reason: HOSPADM

## 2024-02-10 RX ORDER — ACETAMINOPHEN 160 MG/5ML
650 SUSPENSION ORAL EVERY 4 HOURS PRN
Status: DISCONTINUED | OUTPATIENT
Start: 2024-02-10 | End: 2024-02-14

## 2024-02-10 RX ORDER — LABETALOL HYDROCHLORIDE 5 MG/ML
20 INJECTION, SOLUTION INTRAVENOUS ONCE
Status: COMPLETED | OUTPATIENT
Start: 2024-02-10 | End: 2024-02-10

## 2024-02-10 RX ORDER — CALCIUM GLUCONATE 98 MG/ML
1 INJECTION, SOLUTION INTRAVENOUS ONCE AS NEEDED
Status: DISCONTINUED | OUTPATIENT
Start: 2024-02-10 | End: 2024-02-14

## 2024-02-10 RX ORDER — DIPHENHYDRAMINE HCL 25 MG
25 CAPSULE ORAL ONCE
Status: COMPLETED | OUTPATIENT
Start: 2024-02-10 | End: 2024-02-10

## 2024-02-10 RX ORDER — LABETALOL HYDROCHLORIDE 5 MG/ML
20 INJECTION, SOLUTION INTRAVENOUS ONCE AS NEEDED
Status: COMPLETED | OUTPATIENT
Start: 2024-02-10 | End: 2024-02-10

## 2024-02-10 RX ORDER — CYCLOBENZAPRINE HCL 10 MG
5 TABLET ORAL 3 TIMES DAILY PRN
Status: DISCONTINUED | OUTPATIENT
Start: 2024-02-10 | End: 2024-02-14

## 2024-02-10 RX ORDER — ACETAMINOPHEN 325 MG/1
975 TABLET ORAL ONCE
Status: COMPLETED | OUTPATIENT
Start: 2024-02-10 | End: 2024-02-10

## 2024-02-10 RX ORDER — TERBUTALINE SULFATE 1 MG/ML
0.25 INJECTION SUBCUTANEOUS ONCE AS NEEDED
Status: DISCONTINUED | OUTPATIENT
Start: 2024-02-10 | End: 2024-02-10 | Stop reason: HOSPADM

## 2024-02-10 RX ORDER — LOPERAMIDE HYDROCHLORIDE 2 MG/1
4 CAPSULE ORAL EVERY 2 HOUR PRN
Status: DISCONTINUED | OUTPATIENT
Start: 2024-02-10 | End: 2024-02-10 | Stop reason: HOSPADM

## 2024-02-10 RX ORDER — ACETAMINOPHEN 650 MG/1
650 SUPPOSITORY RECTAL EVERY 4 HOURS PRN
Status: DISCONTINUED | OUTPATIENT
Start: 2024-02-10 | End: 2024-02-14

## 2024-02-10 RX ORDER — OXYTOCIN/0.9 % SODIUM CHLORIDE 30/500 ML
60 PLASTIC BAG, INJECTION (ML) INTRAVENOUS ONCE AS NEEDED
Status: DISCONTINUED | OUTPATIENT
Start: 2024-02-10 | End: 2024-02-10 | Stop reason: HOSPADM

## 2024-02-10 RX ORDER — NIFEDIPINE 10 MG/1
10 CAPSULE ORAL ONCE AS NEEDED
Status: DISCONTINUED | OUTPATIENT
Start: 2024-02-10 | End: 2024-02-10 | Stop reason: HOSPADM

## 2024-02-10 RX ORDER — LABETALOL 100 MG/1
400 TABLET, FILM COATED ORAL 2 TIMES DAILY
Status: DISCONTINUED | OUTPATIENT
Start: 2024-02-10 | End: 2024-02-10

## 2024-02-10 RX ORDER — METOCLOPRAMIDE 10 MG/1
10 TABLET ORAL EVERY 6 HOURS PRN
Status: DISCONTINUED | OUTPATIENT
Start: 2024-02-10 | End: 2024-02-14

## 2024-02-10 RX ORDER — BETAMETHASONE SODIUM PHOSPHATE AND BETAMETHASONE ACETATE 3; 3 MG/ML; MG/ML
12 INJECTION, SUSPENSION INTRA-ARTICULAR; INTRALESIONAL; INTRAMUSCULAR; SOFT TISSUE ONCE
Status: COMPLETED | OUTPATIENT
Start: 2024-02-10 | End: 2024-02-10

## 2024-02-10 RX ADMIN — CYCLOBENZAPRINE 5 MG: 10 TABLET, FILM COATED ORAL at 21:28

## 2024-02-10 RX ADMIN — BETAMETHASONE ACETATE AND BETAMETHASONE SODIUM PHOSPHATE 12 MG: 3; 3 INJECTION, SUSPENSION INTRA-ARTICULAR; INTRALESIONAL; INTRAMUSCULAR; SOFT TISSUE at 22:30

## 2024-02-10 RX ADMIN — LABETALOL HYDROCHLORIDE 400 MG: 100 TABLET, FILM COATED ORAL at 09:19

## 2024-02-10 RX ADMIN — ACETAMINOPHEN 650 MG: 325 TABLET ORAL at 13:52

## 2024-02-10 RX ADMIN — DIPHENHYDRAMINE HYDROCHLORIDE 25 MG: 25 CAPSULE ORAL at 02:54

## 2024-02-10 RX ADMIN — MAGNESIUM SULFATE HEPTAHYDRATE 2 G/HR: 40 INJECTION, SOLUTION INTRAVENOUS at 01:16

## 2024-02-10 RX ADMIN — LABETALOL HYDROCHLORIDE 20 MG: 5 INJECTION, SOLUTION INTRAVENOUS at 01:53

## 2024-02-10 RX ADMIN — SODIUM CHLORIDE, POTASSIUM CHLORIDE, SODIUM LACTATE AND CALCIUM CHLORIDE 75 ML/HR: 600; 310; 30; 20 INJECTION, SOLUTION INTRAVENOUS at 01:13

## 2024-02-10 RX ADMIN — DIPHENHYDRAMINE HYDROCHLORIDE 25 MG: 50 INJECTION INTRAMUSCULAR; INTRAVENOUS at 22:45

## 2024-02-10 RX ADMIN — LABETALOL HYDROCHLORIDE 400 MG: 200 TABLET, FILM COATED ORAL at 20:59

## 2024-02-10 RX ADMIN — LABETALOL HYDROCHLORIDE 20 MG: 5 INJECTION, SOLUTION INTRAVENOUS at 01:54

## 2024-02-10 RX ADMIN — DIPHENHYDRAMINE HYDROCHLORIDE 25 MG: 50 INJECTION INTRAMUSCULAR; INTRAVENOUS at 16:44

## 2024-02-10 RX ADMIN — ACETAMINOPHEN 650 MG: 325 TABLET ORAL at 21:05

## 2024-02-10 RX ADMIN — METOCLOPRAMIDE 10 MG: 10 TABLET ORAL at 21:28

## 2024-02-10 RX ADMIN — ACETAMINOPHEN 975 MG: 325 TABLET ORAL at 02:54

## 2024-02-10 ASSESSMENT — PAIN SCALES - GENERAL
PAINLEVEL_OUTOF10: 2
PAINLEVEL_OUTOF10: 4
PAINLEVEL_OUTOF10: 3
PAINLEVEL_OUTOF10: 4
PAINLEVEL_OUTOF10: 4
PAINLEVEL_OUTOF10: 2
PAINLEVEL_OUTOF10: 3
PAINLEVEL_OUTOF10: 0 - NO PAIN
PAINLEVEL_OUTOF10: 4
PAINLEVEL_OUTOF10: 4

## 2024-02-10 ASSESSMENT — PAIN DESCRIPTION - DESCRIPTORS
DESCRIPTORS: ACHING
DESCRIPTORS: HEADACHE

## 2024-02-10 ASSESSMENT — ACTIVITIES OF DAILY LIVING (ADL): LACK_OF_TRANSPORTATION: NO

## 2024-02-10 ASSESSMENT — PAIN DESCRIPTION - LOCATION
LOCATION: HEAD
LOCATION: HEAD

## 2024-02-10 NOTE — SIGNIFICANT EVENT
To bedside to evaluate patient after pulse ox dropping to 92% while sleeping. Patient asymptomatic. Headache resolved with treatment and sleep. Lungs clear to auscultation bilaterally, no wheezes/rales/rhonchi. Pulse ox increased to 98% in room with deep breaths. Suspect atelectasis while sleeping. Encouraged deep breaths. If O2 sats continue to drop, will order CXR    MD MICHAEL Ruth

## 2024-02-10 NOTE — INDIVIDUALIZED OVERALL PLAN OF CARE NOTE
Patient asymptomatic, BP stable. Stable for transfer to [facility]    Fetal Assessment  Movement: Present  Mode: External US  Baseline Fetal Heart Rate (bpm): 125 bpm  Baseline Classification: Normal  Variability: Moderate (Between 6 and 25 BPM)  Pattern: Accelerations  FHR Category: Category I  Multiple Births: No      Contraction Frequency: 4-10 (pt does not feel contractions)

## 2024-02-10 NOTE — H&P
Obstetrical Admission History and Physical     Nory Rao is a 34 y.o.  at 33w3d. VIVIAN: 3/26/2024, by Last Menstrual Period. Estimated fetal weight: 5#10oz, 74%ile on 24 . She has had prenatal care with [facility] .    Chief Complaint: Hypertension    Assessment/Plan    Chronic hypertension on labetalol 200Q am, 400Q PM, presents to triage new onset headache, feeling generally unwell, and home -160/100s    Serial Blood pressure monitoring  Pre-eclampsia labs ordered, normal on , will check trend  Tylenol PRN headache.  Pt due for PM labetalol dose now, ordered 400mg PO x 1  Category 1 FHR, continue to monitor      Active Problems:  There are no active Hospital Problems.      Pregnancy Problems (from 26 to present)    CHTN vs GHTN, diagnosed at initial OB visit  BMI 50      Subjective   Nory is here complaining of high blood pressure. Was feeling generally unwell this evening with a mild headache. Checked her home BP and it was 157/112, repeat was 164/99. Didn't try any meds for her headache. No vision changes, no nausea/vomiting, no RUQ pain. Is feeling more swollen, states her shoes and rings don't fit well anymore    Baby is moving well, denies contractions/vaginal bleeding/leaking fluid      Obstetrical History   OB History    Para Term  AB Living   1             SAB IAB Ectopic Multiple Live Births                  # Outcome Date GA Lbr Reji/2nd Weight Sex Delivery Anes PTL Lv   1 Current                Past Medical History  No past medical history on file.     Past Surgical History   L5 discectomy  Mohs surgery for skin cancer    Social History  Denies tobacco/etoh/drug use      Allergies  Advil [ibuprofen], Sudafed [pseudoephedrine hcl], Vancomycin, and Penicillins     Medications  Medications Prior to Admission   Medication Sig Dispense Refill Last Dose    labetalol (Normodyne) 200 mg tablet Take 1 tablet (200 mg) by mouth 1 time. AM   2024    labetalol (Normodyne) 200  mg tablet Take 2 tablets (400 mg) by mouth 2 times a day. PM   2/8/2024       Objective    Last Vitals  Temp Pulse Resp BP MAP O2 Sat   36.7 °C (98.1 °F) 67 18 (!) 166/80   99 %     Physical Examination  GENERAL: Examination reveals a well developed, well nourished, gravid female in no acute distress. She is alert and cooperative.  ABDOMEN: soft, gravid, nontender, nondistended, no abnormal masses, no epigastric pain  FHR is 140, mod su , with accels , and a category 1  tracing.    [place] reading:  acontractile  EXTREMITIES: 1+ edema bilaterally    Lab Review  Cr 0.6, BUN 6  AST 45 ALT 53  Uric Acid 5.1  Urine random protein 6  Urine random cr 21.5  Spot TP:Cr 0.28  Plt 144K

## 2024-02-10 NOTE — CONSULTS
MFM Consult    Reason for Consult: cHTN with sustained severe range Bps requiring IV antihypertensives     HPI: 33 yo G1 at 33.4 wks by LMP cw OSH dating presented with elevated Bps and new headache at home      Patient with history of cHTN on labetalol 200/400 (recently increased from 200/200), and has had mild range and severe range Bps in the first trimester. After transfer to [facility] her headache was treated with tylenol/benadryl and resolved. She was started on magnesium for seizure prophylaxis and was observed on labor and delivery.    Pregnancy notable for:   -cHTN on labetalol 400 BID (increased 2/10)  - BMI 50     Obstetrical History   OB History          1    Para        Term                AB        Living             SAB        IAB        Ectopic        Multiple        Live Births                     Past Medical History  No past medical history on file.     Past Surgical History   No past surgical history on file.    Social History  Social History     Socioeconomic History    Marital status:      Spouse name: Not on file    Number of children: Not on file    Years of education: Not on file    Highest education level: Not on file   Occupational History    Not on file   Tobacco Use    Smoking status: Not on file    Smokeless tobacco: Not on file   Substance and Sexual Activity    Alcohol use: Not on file    Drug use: Not on file    Sexual activity: Not on file   Other Topics Concern    Not on file   Social History Narrative    Not on file     Social Determinants of Health     Financial Resource Strain: Low Risk  (2/10/2024)    Overall Financial Resource Strain (CARDIA)     Difficulty of Paying Living Expenses: Not hard at all   Food Insecurity: Not on file   Transportation Needs: No Transportation Needs (2/10/2024)    PRAPARE - Transportation     Lack of Transportation (Medical): No     Lack of Transportation (Non-Medical): No   Physical Activity: Not on file   Stress: Not on file  "  Social Connections: Not on file   Intimate Partner Violence: Not on file       Allergies  Allergies   Allergen Reactions    Advil [Ibuprofen] Angioedema    Sudafed [Pseudoephedrine Hcl] Angioedema    Vancomycin Angioedema    Penicillins Rash       Medications  Medications Prior to Admission   Medication Sig Dispense Refill Last Dose    labetalol (Normodyne) 200 mg tablet Take 1 tablet (200 mg) by mouth 1 time. AM   2024    labetalol (Normodyne) 200 mg tablet Take 2 tablets (400 mg) by mouth 2 times a day. PM   2024       OBJECTIVE:   /59   Pulse 84   Temp 36.5 °C (97.7 °F) (Temporal)   Resp 18   Wt 131 kg (289 lb 11 oz)   LMP 2023   SpO2 98%   BMI 49.70 kg/m²    Temp  Min: 36.2 °C (97.2 °F)  Max: 36.7 °C (98.1 °F)  Pulse  Min: 65  Max: 98  BP  Min: 114/59  Max: 186/100    Physical exam:  General:  AAOx3, No acute distress  Cardiovascular: Warm and well perfused  Respiratory: Normal respiratory effort   Abdominal:  Soft, gravid, non-tender, no rebound or guarding, no palpable contractions   Extremities: well perfused,     NST: Baseline 130, accelerations +, - decelerations, mod variability   [place]: q5-10 min ctx, patient comfortable      Labs:   Lab Results   Component Value Date    WBC 10.8 02/10/2024    HGB 11.3 (L) 02/10/2024    HCT 34.9 (L) 02/10/2024     (L) 02/10/2024     Lab Results   Component Value Date    GLUCOSE 143 (H) 02/10/2024     02/10/2024    K 4.2 02/10/2024     02/10/2024    CO2 19 (L) 02/10/2024    ANIONGAP 17 02/10/2024    BUN 7 02/10/2024    CREATININE 0.62 02/10/2024    EGFR >90 02/10/2024    CALCIUM 9.5 02/10/2024    ALBUMIN 3.6 02/10/2024    PROT 6.3 (L) 02/10/2024    ALKPHOS 127 (H) 02/10/2024    ALT 52 (H) 02/10/2024    AST 40 (H) 02/10/2024    BILITOT 0.5 02/10/2024     No results found for: \"UTPCR\"    ASSESSMENT AND PLAN:     34 y.o.  at 33w4d by OSH dating with severe range Bps in setting of cHTN.     cHTN exacerbation vs siPEC w/SF  - " Patient with severe range BPs requiring IV treatment last treated 2/10 at 0200 with labetalol 20mg  - Current antihypertensive regimen: labetalol 400mg BID (uptitrated 2/10)  - HELLP labs notable for ALT/AST 53/45 > 52/40, platelets 144 > 147   - Headache on presentation resolved   - patient stable, will discontinue magnesium 2g/hr for seizure prophylaxis  - Reviewed cHTN exacerbation vs siPEC with patient,     Fetal status: Reassuring, currently on CEFM while on Mg   - continue daily NSTs while inpatient   - Ultrasound: cephalic, EFW  2/6/24 at 74%   - S/p BMZx1 on 2/9, for second dose in 24 hours    - NICU consult pending     Routine:  - GBS pending 2/10, high risk PCN/vanc allergy, for sensitivities  - 1hr 104  - BCM: will address     Dispo: inpatient for continued blood pressure monitoring     Pt seen and discussed with M Attending, Dr. Dueñas.     Latoya Wen MD   [facility]  Pager [phone]     Principal Problem:    Severe pre-eclampsia complicating childbirth

## 2024-02-10 NOTE — CARE PLAN
The patient's goals for the shift include  no s/sx of PEC    The clinical goals for the shift include BP <160/110

## 2024-02-10 NOTE — H&P
Obstetrical Admission History and Physical     Nory Rao is a 34 y.o.  at 33w4d. VIVIAN: 3/26/2024, by Last Menstrual Period. Estimated fetal weight: 5#10oz, 74%ile on 24 US. She has had prenatal care with [facility] .    Chief Complaint: HTN    Assessment/Plan    35yo  at 33.4wga admitted with siPEC w/SF    siPEC w/SF  - Diagnosed by severe range BPs requiring IV treatment   - Last treated at 0200 with labetalol 20mg  - Current antihypertensive regimen: labetalol 400mg BID (uptitrated 2/10)  - HELLP labs notable for ALT/AST 53/45, platelets 144, repeat set on admission pending  - Headache 1/10, treating with tylenol and benadryl  - Continue magnesium 2g/hr for seizure prophylaxis  - Reviewed diagnosis of severe pre-eclampsia with patient including risks of HTN, seizures, strokes, organ damage, fetal growth restriction, increased risk of CS,  delivery. Patient expressed understanding  - Recommend inpatient admission until delivery at 34 weeks unless earlier indication arises    IUP at 33wks  - Cephalic on admission, unstable lie  - GBS pending 2/10, high risk PCN/vanc allergy, for sensitivities  - BMZ , for second dose 2/10 if still pregnant  - Last growth US 24 at 74%  - 1hr GCT wnl at 104  - For SSUS in AM    Seen and discussed with Dr. Iqra Sun MD  OBGYN    Principal Problem:    Severe pre-eclampsia complicating childbirth      Pregnancy Problems (from 24 to present)       Problem Noted Resolved    Severe pre-eclampsia complicating childbirth 2/10/2024 by Adilene Sun MD No    Priority:  Medium            Subjective   Nory is here complaining of high blood pressure.  Hypertension symptoms: Headache  Patient sent as a transfer from [facility] for siPEC w/SF. Presented there after feeling unwell after work and having severe range BPs at home. Also reporting mild headache and hands/feet swelling. Denies other PEC sxs. Required IV treatment with IV labetalol 20mg  x1 at [facility] and transferred to [facility] for further care. Received BMZx1 at 2210 on .    Patient reports that she had a severe range BP at her first prenatal visit but has otherwise been well controlled on labetalol 200mg BID this pregnancy. Recently uptitrated to labetalol 200/400 last week. Previously did not tolerate nifedipine.    Good fetal movement. Denies vaginal bleeding., Denies contractions., Denies leaking of fluid.           Obstetrical History   OB History    Para Term  AB Living   1             SAB IAB Ectopic Multiple Live Births                  # Outcome Date GA Lbr Reji/2nd Weight Sex Delivery Anes PTL Lv   1 Current                Past Medical History  No past medical history on file.     Past Surgical History   No past surgical history on file.    Social History  Social History     Tobacco Use    Smoking status: Not on file    Smokeless tobacco: Not on file   Substance Use Topics    Alcohol use: Not on file     Substance and Sexual Activity   Drug Use Not on file       Allergies  Advil [ibuprofen], Sudafed [pseudoephedrine hcl], Vancomycin, and Penicillins     Medications  Medications Prior to Admission   Medication Sig Dispense Refill Last Dose    labetalol (Normodyne) 200 mg tablet Take 1 tablet (200 mg) by mouth 1 time. AM   2024    labetalol (Normodyne) 200 mg tablet Take 2 tablets (400 mg) by mouth 2 times a day. PM   2024       Objective    Last Vitals  Temp Pulse Resp BP MAP O2 Sat   36.6 °C (97.9 °F) 80 18 (!) 149/73   98 %     Physical Examination  General: no acute distress  HEENT: normocephalic, atraumatic  Heart: warm and well perfused  Lungs: breathing comfortably on room air  Abdomen: gravid  Extremities: moving all extremities  Neuro: awake and conversant  Psych: appropriate mood and affect  BSUS: cephalic presentation, normal appearing fluid  FHT: 135/mod/+accel/-decel  [place]: no contractions    Lab Review  Lab Results   Component Value Date    WBC 10.8  "02/10/2024    HGB 11.3 (L) 02/10/2024    HCT 34.9 (L) 02/10/2024     (L) 02/10/2024     No results found for: \"GLUCOSE\", \"NA\", \"K\", \"CL\", \"CO2\", \"ANIONGAP\", \"BUN\", \"CREATININE\", \"EGFR\", \"CALCIUM\", \"ALBUMIN\", \"PROT\", \"ALKPHOS\", \"ALT\", \"AST\", \"BILITOT\"    "

## 2024-02-11 ENCOUNTER — ANESTHESIA EVENT (OUTPATIENT)
Dept: OBSTETRICS AND GYNECOLOGY | Facility: HOSPITAL | Age: 34
End: 2024-02-11
Payer: COMMERCIAL

## 2024-02-11 ENCOUNTER — ANESTHESIA (OUTPATIENT)
Dept: OBSTETRICS AND GYNECOLOGY | Facility: HOSPITAL | Age: 34
End: 2024-02-11
Payer: COMMERCIAL

## 2024-02-11 PROBLEM — R79.89 ELEVATED LIVER FUNCTION TESTS: Status: ACTIVE | Noted: 2024-02-11

## 2024-02-11 LAB
ALBUMIN SERPL BCP-MCNC: 3.1 G/DL (ref 3.4–5)
ALBUMIN SERPL BCP-MCNC: 3.2 G/DL (ref 3.4–5)
ALP SERPL-CCNC: 113 U/L (ref 33–110)
ALP SERPL-CCNC: 121 U/L (ref 33–110)
ALT SERPL W P-5'-P-CCNC: 64 U/L (ref 7–45)
ALT SERPL W P-5'-P-CCNC: 75 U/L (ref 7–45)
ANION GAP SERPL CALC-SCNC: 13 MMOL/L (ref 10–20)
ANION GAP SERPL CALC-SCNC: 15 MMOL/L (ref 10–20)
AST SERPL W P-5'-P-CCNC: 54 U/L (ref 9–39)
AST SERPL W P-5'-P-CCNC: 55 U/L (ref 9–39)
BILIRUB SERPL-MCNC: 0.4 MG/DL (ref 0–1.2)
BILIRUB SERPL-MCNC: 0.5 MG/DL (ref 0–1.2)
BUN SERPL-MCNC: 8 MG/DL (ref 6–23)
BUN SERPL-MCNC: 9 MG/DL (ref 6–23)
CALCIUM SERPL-MCNC: 8.9 MG/DL (ref 8.6–10.6)
CALCIUM SERPL-MCNC: 9.1 MG/DL (ref 8.6–10.6)
CHLORIDE SERPL-SCNC: 103 MMOL/L (ref 98–107)
CHLORIDE SERPL-SCNC: 103 MMOL/L (ref 98–107)
CO2 SERPL-SCNC: 22 MMOL/L (ref 21–32)
CO2 SERPL-SCNC: 23 MMOL/L (ref 21–32)
COLLECT DURATION TIME SPEC: 23 HRS
CREAT 24H UR-MRATE: 1.67 G/24 H (ref 0.67–1.59)
CREAT SERPL-MCNC: 0.51 MG/DL (ref 0.5–1.05)
CREAT SERPL-MCNC: 0.62 MG/DL (ref 0.5–1.05)
CREAT UR-MCNC: 111.3 MG/DL (ref 20–320)
EGFRCR SERPLBLD CKD-EPI 2021: >90 ML/MIN/1.73M*2
EGFRCR SERPLBLD CKD-EPI 2021: >90 ML/MIN/1.73M*2
ERYTHROCYTE [DISTWIDTH] IN BLOOD BY AUTOMATED COUNT: 14.5 % (ref 11.5–14.5)
ERYTHROCYTE [DISTWIDTH] IN BLOOD BY AUTOMATED COUNT: 14.5 % (ref 11.5–14.5)
GLUCOSE SERPL-MCNC: 108 MG/DL (ref 74–99)
GLUCOSE SERPL-MCNC: 114 MG/DL (ref 74–99)
HCT VFR BLD AUTO: 33.5 % (ref 36–46)
HCT VFR BLD AUTO: 34 % (ref 36–46)
HGB BLD-MCNC: 10.7 G/DL (ref 12–16)
HGB BLD-MCNC: 11.3 G/DL (ref 12–16)
MCH RBC QN AUTO: 28.9 PG (ref 26–34)
MCH RBC QN AUTO: 30.1 PG (ref 26–34)
MCHC RBC AUTO-ENTMCNC: 31.9 G/DL (ref 32–36)
MCHC RBC AUTO-ENTMCNC: 33.2 G/DL (ref 32–36)
MCV RBC AUTO: 90 FL (ref 80–100)
MCV RBC AUTO: 91 FL (ref 80–100)
NRBC BLD-RTO: 0.4 /100 WBCS (ref 0–0)
NRBC BLD-RTO: 0.4 /100 WBCS (ref 0–0)
PLATELET # BLD AUTO: 148 X10*3/UL (ref 150–450)
PLATELET # BLD AUTO: 162 X10*3/UL (ref 150–450)
POTASSIUM SERPL-SCNC: 3.6 MMOL/L (ref 3.5–5.3)
POTASSIUM SERPL-SCNC: 3.8 MMOL/L (ref 3.5–5.3)
PROT 24H UR-MCNC: 37 MG/DL (ref 5–24)
PROT SERPL-MCNC: 5.8 G/DL (ref 6.4–8.2)
PROT SERPL-MCNC: 6.1 G/DL (ref 6.4–8.2)
RBC # BLD AUTO: 3.7 X10*6/UL (ref 4–5.2)
RBC # BLD AUTO: 3.76 X10*6/UL (ref 4–5.2)
SODIUM SERPL-SCNC: 134 MMOL/L (ref 136–145)
SODIUM SERPL-SCNC: 137 MMOL/L (ref 136–145)
SPECIMEN VOL ?TM UR: 1500 ML
TOTAL PROTEIN (MG/24HR) IN 24 HOUR URINE: 555 MG/24H (ref 0–149)
WBC # BLD AUTO: 10.2 X10*3/UL (ref 4.4–11.3)
WBC # BLD AUTO: 9.5 X10*3/UL (ref 4.4–11.3)

## 2024-02-11 PROCEDURE — 2500000001 HC RX 250 WO HCPCS SELF ADMINISTERED DRUGS (ALT 637 FOR MEDICARE OP)

## 2024-02-11 PROCEDURE — 85027 COMPLETE CBC AUTOMATED: CPT

## 2024-02-11 PROCEDURE — 3E0P7VZ INTRODUCTION OF HORMONE INTO FEMALE REPRODUCTIVE, VIA NATURAL OR ARTIFICIAL OPENING: ICD-10-PCS | Performed by: NURSE PRACTITIONER

## 2024-02-11 PROCEDURE — 2500000001 HC RX 250 WO HCPCS SELF ADMINISTERED DRUGS (ALT 637 FOR MEDICARE OP): Performed by: STUDENT IN AN ORGANIZED HEALTH CARE EDUCATION/TRAINING PROGRAM

## 2024-02-11 PROCEDURE — 36415 COLL VENOUS BLD VENIPUNCTURE: CPT

## 2024-02-11 PROCEDURE — 1100000001 HC PRIVATE ROOM DAILY

## 2024-02-11 PROCEDURE — 59025 FETAL NON-STRESS TEST: CPT | Mod: GC

## 2024-02-11 PROCEDURE — 2500000004 HC RX 250 GENERAL PHARMACY W/ HCPCS (ALT 636 FOR OP/ED): Performed by: STUDENT IN AN ORGANIZED HEALTH CARE EDUCATION/TRAINING PROGRAM

## 2024-02-11 PROCEDURE — 2500000004 HC RX 250 GENERAL PHARMACY W/ HCPCS (ALT 636 FOR OP/ED)

## 2024-02-11 PROCEDURE — 81050 URINALYSIS VOLUME MEASURE: CPT | Performed by: STUDENT IN AN ORGANIZED HEALTH CARE EDUCATION/TRAINING PROGRAM

## 2024-02-11 PROCEDURE — 59025 FETAL NON-STRESS TEST: CPT

## 2024-02-11 PROCEDURE — 84075 ASSAY ALKALINE PHOSPHATASE: CPT

## 2024-02-11 PROCEDURE — 99233 SBSQ HOSP IP/OBS HIGH 50: CPT

## 2024-02-11 RX ORDER — METHYLERGONOVINE MALEATE 0.2 MG/ML
0.2 INJECTION INTRAVENOUS ONCE AS NEEDED
Status: DISCONTINUED | OUTPATIENT
Start: 2024-02-11 | End: 2024-02-14 | Stop reason: HOSPADM

## 2024-02-11 RX ORDER — METOCLOPRAMIDE 10 MG/1
10 TABLET ORAL EVERY 6 HOURS PRN
Status: DISCONTINUED | OUTPATIENT
Start: 2024-02-11 | End: 2024-02-14 | Stop reason: HOSPADM

## 2024-02-11 RX ORDER — TRANEXAMIC ACID 100 MG/ML
1000 INJECTION, SOLUTION INTRAVENOUS ONCE AS NEEDED
Status: COMPLETED | OUTPATIENT
Start: 2024-02-11 | End: 2024-02-13

## 2024-02-11 RX ORDER — MAGNESIUM SULFATE HEPTAHYDRATE 40 MG/ML
2 INJECTION, SOLUTION INTRAVENOUS CONTINUOUS
Status: DISCONTINUED | OUTPATIENT
Start: 2024-02-11 | End: 2024-02-14

## 2024-02-11 RX ORDER — SUMATRIPTAN 50 MG/1
50 TABLET, FILM COATED ORAL ONCE
Status: COMPLETED | OUTPATIENT
Start: 2024-02-11 | End: 2024-02-11

## 2024-02-11 RX ORDER — CALCIUM CARBONATE 200(500)MG
500 TABLET,CHEWABLE ORAL DAILY
Status: DISCONTINUED | OUTPATIENT
Start: 2024-02-11 | End: 2024-02-14

## 2024-02-11 RX ORDER — CALCIUM GLUCONATE 98 MG/ML
1 INJECTION, SOLUTION INTRAVENOUS ONCE AS NEEDED
Status: DISCONTINUED | OUTPATIENT
Start: 2024-02-11 | End: 2024-02-14 | Stop reason: HOSPADM

## 2024-02-11 RX ORDER — OXYTOCIN 10 [USP'U]/ML
10 INJECTION, SOLUTION INTRAMUSCULAR; INTRAVENOUS ONCE AS NEEDED
Status: DISCONTINUED | OUTPATIENT
Start: 2024-02-11 | End: 2024-02-14 | Stop reason: HOSPADM

## 2024-02-11 RX ORDER — NIFEDIPINE 10 MG/1
10 CAPSULE ORAL ONCE AS NEEDED
Status: DISCONTINUED | OUTPATIENT
Start: 2024-02-11 | End: 2024-02-14 | Stop reason: HOSPADM

## 2024-02-11 RX ORDER — LIDOCAINE HYDROCHLORIDE 10 MG/ML
30 INJECTION INFILTRATION; PERINEURAL ONCE AS NEEDED
Status: COMPLETED | OUTPATIENT
Start: 2024-02-11 | End: 2024-02-13

## 2024-02-11 RX ORDER — TERBUTALINE SULFATE 1 MG/ML
0.25 INJECTION SUBCUTANEOUS ONCE AS NEEDED
Status: DISCONTINUED | OUTPATIENT
Start: 2024-02-11 | End: 2024-02-14 | Stop reason: HOSPADM

## 2024-02-11 RX ORDER — ONDANSETRON HYDROCHLORIDE 2 MG/ML
4 INJECTION, SOLUTION INTRAVENOUS EVERY 6 HOURS PRN
Status: DISCONTINUED | OUTPATIENT
Start: 2024-02-11 | End: 2024-02-11 | Stop reason: SDUPTHER

## 2024-02-11 RX ORDER — HYDRALAZINE HYDROCHLORIDE 20 MG/ML
5 INJECTION INTRAMUSCULAR; INTRAVENOUS ONCE AS NEEDED
Status: COMPLETED | OUTPATIENT
Start: 2024-02-11 | End: 2024-02-12

## 2024-02-11 RX ORDER — LOPERAMIDE HYDROCHLORIDE 2 MG/1
4 CAPSULE ORAL EVERY 2 HOUR PRN
Status: COMPLETED | OUTPATIENT
Start: 2024-02-11 | End: 2024-02-13

## 2024-02-11 RX ORDER — CLINDAMYCIN PHOSPHATE 900 MG/50ML
900 INJECTION, SOLUTION INTRAVENOUS EVERY 8 HOURS
Status: DISCONTINUED | OUTPATIENT
Start: 2024-02-11 | End: 2024-02-12

## 2024-02-11 RX ORDER — OXYTOCIN/0.9 % SODIUM CHLORIDE 30/500 ML
60 PLASTIC BAG, INJECTION (ML) INTRAVENOUS ONCE AS NEEDED
Status: DISCONTINUED | OUTPATIENT
Start: 2024-02-11 | End: 2024-02-14 | Stop reason: HOSPADM

## 2024-02-11 RX ORDER — CARBOPROST TROMETHAMINE 250 UG/ML
250 INJECTION, SOLUTION INTRAMUSCULAR ONCE AS NEEDED
Status: COMPLETED | OUTPATIENT
Start: 2024-02-11 | End: 2024-02-13

## 2024-02-11 RX ORDER — CALCIUM CARBONATE 200(500)MG
500 TABLET,CHEWABLE ORAL DAILY
Status: DISCONTINUED | OUTPATIENT
Start: 2024-02-11 | End: 2024-02-11

## 2024-02-11 RX ORDER — LABETALOL HYDROCHLORIDE 5 MG/ML
20 INJECTION, SOLUTION INTRAVENOUS ONCE AS NEEDED
Status: DISCONTINUED | OUTPATIENT
Start: 2024-02-11 | End: 2024-02-14 | Stop reason: HOSPADM

## 2024-02-11 RX ORDER — MAGNESIUM SULFATE HEPTAHYDRATE 40 MG/ML
2 INJECTION, SOLUTION INTRAVENOUS ONCE
Status: COMPLETED | OUTPATIENT
Start: 2024-02-11 | End: 2024-02-11

## 2024-02-11 RX ORDER — CAFFEINE 200 MG
100 TABLET ORAL ONCE
Status: COMPLETED | OUTPATIENT
Start: 2024-02-11 | End: 2024-02-11

## 2024-02-11 RX ORDER — METOCLOPRAMIDE HYDROCHLORIDE 5 MG/ML
10 INJECTION INTRAMUSCULAR; INTRAVENOUS EVERY 6 HOURS PRN
Status: DISCONTINUED | OUTPATIENT
Start: 2024-02-11 | End: 2024-02-14 | Stop reason: HOSPADM

## 2024-02-11 RX ORDER — SODIUM CHLORIDE, SODIUM LACTATE, POTASSIUM CHLORIDE, CALCIUM CHLORIDE 600; 310; 30; 20 MG/100ML; MG/100ML; MG/100ML; MG/100ML
125 INJECTION, SOLUTION INTRAVENOUS CONTINUOUS
Status: DISCONTINUED | OUTPATIENT
Start: 2024-02-11 | End: 2024-02-14

## 2024-02-11 RX ORDER — MORPHINE SULFATE 2 MG/ML
2 INJECTION, SOLUTION INTRAMUSCULAR; INTRAVENOUS ONCE
Status: COMPLETED | OUTPATIENT
Start: 2024-02-11 | End: 2024-02-11

## 2024-02-11 RX ORDER — ACETAMINOPHEN 325 MG/1
975 TABLET ORAL ONCE
Status: COMPLETED | OUTPATIENT
Start: 2024-02-11 | End: 2024-02-11

## 2024-02-11 RX ORDER — MISOPROSTOL 200 UG/1
800 TABLET ORAL ONCE AS NEEDED
Status: COMPLETED | OUTPATIENT
Start: 2024-02-11 | End: 2024-02-13

## 2024-02-11 RX ORDER — ONDANSETRON 4 MG/1
4 TABLET, FILM COATED ORAL EVERY 6 HOURS PRN
Status: DISCONTINUED | OUTPATIENT
Start: 2024-02-11 | End: 2024-02-11 | Stop reason: SDUPTHER

## 2024-02-11 RX ADMIN — CLINDAMYCIN PHOSPHATE 900 MG: 900 INJECTION, SOLUTION INTRAVENOUS at 22:32

## 2024-02-11 RX ADMIN — SUMATRIPTAN SUCCINATE 50 MG: 50 TABLET ORAL at 01:27

## 2024-02-11 RX ADMIN — ONDANSETRON 4 MG: 2 INJECTION INTRAMUSCULAR; INTRAVENOUS at 16:20

## 2024-02-11 RX ADMIN — CALCIUM CARBONATE (ANTACID) CHEW TAB 500 MG 500 MG: 500 CHEW TAB at 05:57

## 2024-02-11 RX ADMIN — MORPHINE SULFATE 2 MG: 2 INJECTION, SOLUTION INTRAMUSCULAR; INTRAVENOUS at 21:16

## 2024-02-11 RX ADMIN — CAFFEINE 100 MG: 200 TABLET ORAL at 01:27

## 2024-02-11 RX ADMIN — MISOPROSTOL 25 MCG: 100 TABLET ORAL at 21:00

## 2024-02-11 RX ADMIN — LABETALOL HYDROCHLORIDE 400 MG: 200 TABLET, FILM COATED ORAL at 20:58

## 2024-02-11 RX ADMIN — SODIUM CHLORIDE, POTASSIUM CHLORIDE, SODIUM LACTATE AND CALCIUM CHLORIDE 75 ML/HR: 600; 310; 30; 20 INJECTION, SOLUTION INTRAVENOUS at 16:02

## 2024-02-11 RX ADMIN — SODIUM CHLORIDE, POTASSIUM CHLORIDE, SODIUM LACTATE AND CALCIUM CHLORIDE 75 ML/HR: 600; 310; 30; 20 INJECTION, SOLUTION INTRAVENOUS at 04:39

## 2024-02-11 RX ADMIN — LABETALOL HYDROCHLORIDE 400 MG: 200 TABLET, FILM COATED ORAL at 09:05

## 2024-02-11 RX ADMIN — MAGNESIUM SULFATE HEPTAHYDRATE 2 G/HR: 40 INJECTION, SOLUTION INTRAVENOUS at 16:24

## 2024-02-11 RX ADMIN — ACETAMINOPHEN 975 MG: 325 TABLET ORAL at 01:27

## 2024-02-11 RX ADMIN — ACETAMINOPHEN 975 MG: 325 TABLET ORAL at 21:17

## 2024-02-11 RX ADMIN — MAGNESIUM SULFATE HEPTAHYDRATE 2 G: 40 INJECTION, SOLUTION INTRAVENOUS at 04:40

## 2024-02-11 SDOH — SOCIAL STABILITY: SOCIAL INSECURITY: ARE THERE ANY APPARENT SIGNS OF INJURIES/BEHAVIORS THAT COULD BE RELATED TO ABUSE/NEGLECT?: NO

## 2024-02-11 SDOH — SOCIAL STABILITY: SOCIAL INSECURITY: DOES ANYONE TRY TO KEEP YOU FROM HAVING/CONTACTING OTHER FRIENDS OR DOING THINGS OUTSIDE YOUR HOME?: NO

## 2024-02-11 SDOH — HEALTH STABILITY: MENTAL HEALTH: CURRENT SMOKER: 0

## 2024-02-11 SDOH — SOCIAL STABILITY: SOCIAL INSECURITY: PHYSICAL ABUSE: DENIES

## 2024-02-11 SDOH — ECONOMIC STABILITY: HOUSING INSECURITY: DO YOU FEEL UNSAFE GOING BACK TO THE PLACE WHERE YOU ARE LIVING?: NO

## 2024-02-11 SDOH — SOCIAL STABILITY: SOCIAL INSECURITY: HAVE YOU HAD THOUGHTS OF HARMING ANYONE ELSE?: NO

## 2024-02-11 SDOH — HEALTH STABILITY: MENTAL HEALTH: WERE YOU ABLE TO COMPLETE ALL THE BEHAVIORAL HEALTH SCREENINGS?: YES

## 2024-02-11 SDOH — SOCIAL STABILITY: SOCIAL INSECURITY: DO YOU FEEL ANYONE HAS EXPLOITED OR TAKEN ADVANTAGE OF YOU FINANCIALLY OR OF YOUR PERSONAL PROPERTY?: NO

## 2024-02-11 SDOH — SOCIAL STABILITY: SOCIAL INSECURITY: ARE YOU OR HAVE YOU BEEN THREATENED OR ABUSED PHYSICALLY, EMOTIONALLY, OR SEXUALLY BY ANYONE?: NO

## 2024-02-11 SDOH — HEALTH STABILITY: MENTAL HEALTH: NON-SPECIFIC ACTIVE SUICIDAL THOUGHTS (PAST 1 MONTH): NO

## 2024-02-11 SDOH — SOCIAL STABILITY: SOCIAL INSECURITY: HAS ANYONE EVER THREATENED TO HURT YOUR FAMILY OR YOUR PETS?: NO

## 2024-02-11 SDOH — HEALTH STABILITY: MENTAL HEALTH: SUICIDAL BEHAVIOR (LIFETIME): NO

## 2024-02-11 SDOH — SOCIAL STABILITY: SOCIAL INSECURITY: VERBAL ABUSE: DENIES

## 2024-02-11 SDOH — SOCIAL STABILITY: SOCIAL INSECURITY: ABUSE SCREEN: ADULT

## 2024-02-11 SDOH — HEALTH STABILITY: MENTAL HEALTH: WISH TO BE DEAD (PAST 1 MONTH): NO

## 2024-02-11 ASSESSMENT — PAIN DESCRIPTION - DESCRIPTORS
DESCRIPTORS: ACHING
DESCRIPTORS: ACHING
DESCRIPTORS: HEADACHE
DESCRIPTORS: ACHING
DESCRIPTORS: ACHING
DESCRIPTORS: HEADACHE
DESCRIPTORS: HEADACHE
DESCRIPTORS: ACHING
DESCRIPTORS: HEADACHE
DESCRIPTORS: ACHING

## 2024-02-11 ASSESSMENT — PAIN SCALES - GENERAL
PAINLEVEL_OUTOF10: 2
PAINLEVEL_OUTOF10: 5 - MODERATE PAIN
PAINLEVEL_OUTOF10: 2
PAINLEVEL_OUTOF10: 2
PAINLEVEL_OUTOF10: 3
PAINLEVEL_OUTOF10: 2
PAINLEVEL_OUTOF10: 2
PAINLEVEL_OUTOF10: 3
PAINLEVEL_OUTOF10: 0 - NO PAIN
PAINLEVEL_OUTOF10: 2

## 2024-02-11 ASSESSMENT — LIFESTYLE VARIABLES
HOW OFTEN DO YOU HAVE A DRINK CONTAINING ALCOHOL: NEVER
HOW MANY STANDARD DRINKS CONTAINING ALCOHOL DO YOU HAVE ON A TYPICAL DAY: PATIENT DOES NOT DRINK
SKIP TO QUESTIONS 9-10: 1
AUDIT-C TOTAL SCORE: 0
AUDIT-C TOTAL SCORE: 0
HOW OFTEN DO YOU HAVE 6 OR MORE DRINKS ON ONE OCCASION: NEVER

## 2024-02-11 ASSESSMENT — PATIENT HEALTH QUESTIONNAIRE - PHQ9
SUM OF ALL RESPONSES TO PHQ9 QUESTIONS 1 & 2: 0
1. LITTLE INTEREST OR PLEASURE IN DOING THINGS: NOT AT ALL
2. FEELING DOWN, DEPRESSED OR HOPELESS: NOT AT ALL

## 2024-02-11 ASSESSMENT — PAIN - FUNCTIONAL ASSESSMENT: PAIN_FUNCTIONAL_ASSESSMENT: 0-10

## 2024-02-11 NOTE — CONSULTS
"Progress Note:    ASSESSMENT AND PLAN:     34 y.o.  at 33w4d by OSH dating with severe range Bps in setting of cHTN.     cHTN exacerbation vs siPEC w/SF  - Patient with severe range BPs requiring IV treatment last treated 2/10 at 0200 with labetalol 20mg  - Current antihypertensive regimen: labetalol 400mg BID (uptitrated 2/10)  - HELLP labs notable for ALT/AST 52/40>, platelets 147> 143   - Headache on presentation resolved   - patient stable, will discontinue magnesium 2g/hr for seizure prophylaxis  - Reviewed cHTN exacerbation vs siPEC with patient,     Fetal status: Reassuring, currently on CEFM while on Mg   - continue daily NSTs while inpatient   - Ultrasound: cephalic, EFW  24 at 74%   - S/p BMZx1 on , for second dose in 24 hours    - NICU consult pending     Routine:  - GBS pending 2/10, high risk PCN/vanc allergy, for sensitivities  - 1hr 104  - BCM: will address     Dispo: inpatient for continued blood pressure monitoring     Pt seen and discussed with M Attending, Dr. Dueñas.   Shanelle Thomas MD   [facility]  Pager [phone]      Subjective:  Overnight pt had a headache of 3/10. Multiple agents including Tylenol, reglan, benadryl, caffeine, imitrex, 2 gr Mg bolus were tried. Pt reports having a 2/10 headache currently. She also complains of swelling extensively in the last week.    OBJECTIVE:   BP (!) 153/104   Pulse 66   Temp 36.3 °C (97.3 °F) (Temporal)   Resp 18   Ht 1.646 m (5' 4.8\")   Wt 134 kg (294 lb 8.6 oz)   LMP 2023   SpO2 97%   BMI 49.32 kg/m²    Temp  Min: 36 °C (96.8 °F)  Max: 36.8 °C (98.2 °F)  Pulse  Min: 66  Max: 98  BP  Min: 111/78  Max: 153/104    Physical exam:  General:  AAOx3, No acute distress  Cardiovascular: Warm and well perfused  Respiratory: Normal respiratory effort   Abdominal:  Soft, gravid, non-tender no palpable contractions   Extremities: well perfused,     NST: Baseline 135, accelerations +, - decelerations, mod variability   Standard: quiet, patient comfortable  " "    Labs:   Lab Results   Component Value Date    WBC 8.6 02/10/2024    HGB 10.3 (L) 02/10/2024    HCT 31.3 (L) 02/10/2024     (L) 02/10/2024     Lab Results   Component Value Date    GLUCOSE 108 (H) 02/10/2024     (L) 02/10/2024    K 3.6 02/10/2024     02/10/2024    CO2 22 02/10/2024    ANIONGAP 13 02/10/2024    BUN 8 02/10/2024    CREATININE 0.62 02/10/2024    EGFR >90 02/10/2024    CALCIUM 8.9 02/10/2024    ALBUMIN 3.1 (L) 02/10/2024    PROT 5.8 (L) 02/10/2024    ALKPHOS 113 (H) 02/10/2024    ALT 64 (H) 02/10/2024    AST 54 (H) 02/10/2024    BILITOT 0.5 02/10/2024     No results found for: \"UTPCR\"              "

## 2024-02-11 NOTE — SIGNIFICANT EVENT
Decision for IOL    Patient initially admitted with cHTN exacerbation vs siPEC w/ SD, initially on admission patient with resolved headache, Bps that stabilized after single IV treatment and increase of labetalol 200/400 to 400 BID. Today, she had a headache that was unresponsive to multiple modalities of treatment, and decision was made to proceed with delivery for siPEC with severe neurologic features. Patient desires IOL, infant cephalic re-confirmed by BSUS     Cervical Exam  Dilation: Fingertip  Effacement (%): 0  Fetal Station: -3  OB Examiner: MD Bettye.  Fetal Assessment  Movement: Present  Mode: Intermittent Ausculatation  Baseline Fetal Heart Rate (bpm): 135 bpm  Baseline Classification: Normal  Variability: Moderate (Between 6 and 25 BPM)  Pattern: Accelerations  FHR Category: Category I  Multiple Births: No      Contraction Frequency: denies    CRB placed with speculum and ring forceps. For cytotec when approved by pharmacy      siPEC w/SF   - Diagnosed by severe range BPs requiring IV treatment   - Last treated at 0200 2/10 with labetalol 20mg  - Current antihypertensive regimen: labetalol 400mg BID (uptitrated 2/10)  - HELLP labs notable for ALT/AST 53/45, platelets 144, repeat set on admission pending  - Headache 1/10, treating with tylenol and benadryl > 3/10 2/11, unrelenting headache 2/11     Plan dw Dr Spenser Wen MD  OB/GYN PGY3

## 2024-02-11 NOTE — ANESTHESIA PREPROCEDURE EVALUATION
Patient: Nory Rao    Evaluation Method: In-person visit    Procedure Information    Date: 02/11/24  Procedure: Labor Consult         Relevant Problems   Anesthesia (within normal limits)      Cardiovascular   (+) Severe pre-eclampsia complicating childbirth      Endocrine (within normal limits)      GI (within normal limits)      /Renal (within normal limits)      Neuro/Psych (within normal limits)      Pulmonary (within normal limits)      GI/Hepatic   (+) Elevated liver function tests      Hematology (within normal limits)      Musculoskeletal (within normal limits)      Eyes, Ears, Nose, and Throat (within normal limits)       Clinical information reviewed:   Tobacco  Allergies  Meds   Med Hx  Surg Hx   Fam Hx          NPO Detail:  No data recorded     OB/Gyn Evaluation    Present Pregnancy    Patient is pregnant now.  (+) , hypertensive disorder of pregnancy   Obstetric History                Physical Exam    Airway  Mallampati: II  TM distance: >3 FB  Neck ROM: full     Cardiovascular - normal exam  Rhythm: regular  Rate: normal     Dental - normal exam     Pulmonary - normal exam  Breath sounds clear to auscultation     Abdominal            Anesthesia Plan    History of general anesthesia?: yes  History of complications of general anesthesia?: no    ASA 2     epidural     The patient is not a current smoker.  Patient did not smoke on day of procedure.    Anesthetic plan and risks discussed with patient.  Use of blood products discussed with patient who consented to blood products.    Plan discussed with attending.

## 2024-02-12 ENCOUNTER — ANESTHESIA (OUTPATIENT)
Dept: OBSTETRICS AND GYNECOLOGY | Facility: HOSPITAL | Age: 34
End: 2024-02-12
Payer: COMMERCIAL

## 2024-02-12 ENCOUNTER — ANESTHESIA EVENT (OUTPATIENT)
Dept: OBSTETRICS AND GYNECOLOGY | Facility: HOSPITAL | Age: 34
End: 2024-02-12
Payer: COMMERCIAL

## 2024-02-12 LAB
ABO GROUP (TYPE) IN BLOOD: NORMAL
ALBUMIN SERPL BCP-MCNC: 3.1 G/DL (ref 3.4–5)
ALBUMIN SERPL BCP-MCNC: 3.4 G/DL (ref 3.4–5)
ALBUMIN SERPL BCP-MCNC: 3.5 G/DL (ref 3.4–5)
ALP SERPL-CCNC: 119 U/L (ref 33–110)
ALP SERPL-CCNC: 123 U/L (ref 33–110)
ALP SERPL-CCNC: 128 U/L (ref 33–110)
ALT SERPL W P-5'-P-CCNC: 74 U/L (ref 7–45)
ALT SERPL W P-5'-P-CCNC: 81 U/L (ref 7–45)
ALT SERPL W P-5'-P-CCNC: 84 U/L (ref 7–45)
ANION GAP SERPL CALC-SCNC: 14 MMOL/L (ref 10–20)
ANION GAP SERPL CALC-SCNC: 16 MMOL/L (ref 10–20)
ANION GAP SERPL CALC-SCNC: 17 MMOL/L
AST SERPL W P-5'-P-CCNC: 55 U/L (ref 9–39)
AST SERPL W P-5'-P-CCNC: 61 U/L (ref 9–39)
AST SERPL W P-5'-P-CCNC: 64 U/L (ref 9–39)
BILIRUB SERPL-MCNC: 0.4 MG/DL (ref 0–1.2)
BILIRUB SERPL-MCNC: 0.6 MG/DL (ref 0–1.2)
BILIRUB SERPL-MCNC: 0.7 MG/DL (ref 0–1.2)
BUN SERPL-MCNC: 7 MG/DL (ref 6–23)
BUN SERPL-MCNC: 8 MG/DL (ref 6–23)
BUN SERPL-MCNC: 9 MG/DL (ref 6–23)
CALCIUM SERPL-MCNC: 8.8 MG/DL (ref 8.6–10.6)
CALCIUM SERPL-MCNC: 8.8 MG/DL (ref 8.6–10.6)
CALCIUM SERPL-MCNC: 8.9 MG/DL (ref 8.6–10.6)
CHLORIDE SERPL-SCNC: 102 MMOL/L (ref 98–107)
CHLORIDE SERPL-SCNC: 103 MMOL/L (ref 98–107)
CHLORIDE SERPL-SCNC: 105 MMOL/L (ref 98–107)
CO2 SERPL-SCNC: 21 MMOL/L (ref 21–32)
CO2 SERPL-SCNC: 21 MMOL/L (ref 21–32)
CO2 SERPL-SCNC: 23 MMOL/L (ref 21–32)
CREAT SERPL-MCNC: 0.57 MG/DL (ref 0.5–1.05)
CREAT SERPL-MCNC: 0.6 MG/DL (ref 0.5–1.05)
CREAT SERPL-MCNC: 0.61 MG/DL (ref 0.5–1.05)
EGFRCR SERPLBLD CKD-EPI 2021: >90 ML/MIN/1.73M*2
ERYTHROCYTE [DISTWIDTH] IN BLOOD BY AUTOMATED COUNT: 14.5 % (ref 11.5–14.5)
ERYTHROCYTE [DISTWIDTH] IN BLOOD BY AUTOMATED COUNT: 14.6 % (ref 11.5–14.5)
ERYTHROCYTE [DISTWIDTH] IN BLOOD BY AUTOMATED COUNT: 14.7 % (ref 11.5–14.5)
GLUCOSE SERPL-MCNC: 102 MG/DL (ref 74–99)
GLUCOSE SERPL-MCNC: 62 MG/DL (ref 74–99)
GLUCOSE SERPL-MCNC: 67 MG/DL (ref 74–99)
GP B STREP GENITAL QL CULT: NORMAL
HCT VFR BLD AUTO: 33 % (ref 36–46)
HCT VFR BLD AUTO: 35.4 % (ref 36–46)
HCT VFR BLD AUTO: 36.7 % (ref 36–46)
HGB BLD-MCNC: 10.6 G/DL (ref 12–16)
HGB BLD-MCNC: 11.2 G/DL (ref 12–16)
HGB BLD-MCNC: 11.9 G/DL (ref 12–16)
LDH SERPL L TO P-CCNC: 190 U/L (ref 84–246)
MAGNESIUM SERPL-MCNC: 4.39 MG/DL (ref 1.6–2.4)
MAGNESIUM SERPL-MCNC: 4.66 MG/DL (ref 1.6–2.4)
MCH RBC QN AUTO: 29 PG (ref 26–34)
MCH RBC QN AUTO: 29.1 PG (ref 26–34)
MCH RBC QN AUTO: 29.1 PG (ref 26–34)
MCHC RBC AUTO-ENTMCNC: 31.6 G/DL (ref 32–36)
MCHC RBC AUTO-ENTMCNC: 32.1 G/DL (ref 32–36)
MCHC RBC AUTO-ENTMCNC: 32.4 G/DL (ref 32–36)
MCV RBC AUTO: 90 FL (ref 80–100)
MCV RBC AUTO: 91 FL (ref 80–100)
MCV RBC AUTO: 92 FL (ref 80–100)
NRBC BLD-RTO: 0.2 /100 WBCS (ref 0–0)
NRBC BLD-RTO: 0.3 /100 WBCS (ref 0–0)
NRBC BLD-RTO: 0.4 /100 WBCS (ref 0–0)
PLATELET # BLD AUTO: 145 X10*3/UL (ref 150–450)
PLATELET # BLD AUTO: 154 X10*3/UL (ref 150–450)
PLATELET # BLD AUTO: 158 X10*3/UL (ref 150–450)
POTASSIUM SERPL-SCNC: 3.9 MMOL/L (ref 3.5–5.3)
POTASSIUM SERPL-SCNC: 4.1 MMOL/L (ref 3.5–5.3)
POTASSIUM SERPL-SCNC: 4.1 MMOL/L (ref 3.5–5.3)
PROT SERPL-MCNC: 6.1 G/DL (ref 6.4–8.2)
PROT SERPL-MCNC: 6.3 G/DL (ref 6.4–8.2)
PROT SERPL-MCNC: 6.4 G/DL (ref 6.4–8.2)
RBC # BLD AUTO: 3.64 X10*6/UL (ref 4–5.2)
RBC # BLD AUTO: 3.86 X10*6/UL (ref 4–5.2)
RBC # BLD AUTO: 4.09 X10*6/UL (ref 4–5.2)
RH FACTOR (ANTIGEN D): NORMAL
SODIUM SERPL-SCNC: 136 MMOL/L (ref 136–145)
SODIUM SERPL-SCNC: 136 MMOL/L (ref 136–145)
SODIUM SERPL-SCNC: 138 MMOL/L (ref 136–145)
WBC # BLD AUTO: 10.6 X10*3/UL (ref 4.4–11.3)
WBC # BLD AUTO: 12.8 X10*3/UL (ref 4.4–11.3)
WBC # BLD AUTO: 9 X10*3/UL (ref 4.4–11.3)

## 2024-02-12 PROCEDURE — 10H07YZ INSERTION OF OTHER DEVICE INTO PRODUCTS OF CONCEPTION, VIA NATURAL OR ARTIFICIAL OPENING: ICD-10-PCS | Performed by: NURSE PRACTITIONER

## 2024-02-12 PROCEDURE — 80053 COMPREHEN METABOLIC PANEL: CPT

## 2024-02-12 PROCEDURE — 2500000004 HC RX 250 GENERAL PHARMACY W/ HCPCS (ALT 636 FOR OP/ED)

## 2024-02-12 PROCEDURE — 1100000001 HC PRIVATE ROOM DAILY

## 2024-02-12 PROCEDURE — 4A1H7CZ MONITORING OF PRODUCTS OF CONCEPTION, CARDIAC RATE, VIA NATURAL OR ARTIFICIAL OPENING: ICD-10-PCS | Performed by: NURSE PRACTITIONER

## 2024-02-12 PROCEDURE — 85027 COMPLETE CBC AUTOMATED: CPT

## 2024-02-12 PROCEDURE — 83735 ASSAY OF MAGNESIUM: CPT

## 2024-02-12 PROCEDURE — 01967 NEURAXL LBR ANES VAG DLVR: CPT | Performed by: STUDENT IN AN ORGANIZED HEALTH CARE EDUCATION/TRAINING PROGRAM

## 2024-02-12 PROCEDURE — 85027 COMPLETE CBC AUTOMATED: CPT | Performed by: STUDENT IN AN ORGANIZED HEALTH CARE EDUCATION/TRAINING PROGRAM

## 2024-02-12 PROCEDURE — 10907ZC DRAINAGE OF AMNIOTIC FLUID, THERAPEUTIC FROM PRODUCTS OF CONCEPTION, VIA NATURAL OR ARTIFICIAL OPENING: ICD-10-PCS | Performed by: NURSE PRACTITIONER

## 2024-02-12 PROCEDURE — 36415 COLL VENOUS BLD VENIPUNCTURE: CPT | Performed by: OBSTETRICS & GYNECOLOGY

## 2024-02-12 PROCEDURE — 80053 COMPREHEN METABOLIC PANEL: CPT | Performed by: STUDENT IN AN ORGANIZED HEALTH CARE EDUCATION/TRAINING PROGRAM

## 2024-02-12 PROCEDURE — 2500000005 HC RX 250 GENERAL PHARMACY W/O HCPCS

## 2024-02-12 PROCEDURE — 36415 COLL VENOUS BLD VENIPUNCTURE: CPT

## 2024-02-12 PROCEDURE — 83615 LACTATE (LD) (LDH) ENZYME: CPT

## 2024-02-12 PROCEDURE — 2500000004 HC RX 250 GENERAL PHARMACY W/ HCPCS (ALT 636 FOR OP/ED): Performed by: STUDENT IN AN ORGANIZED HEALTH CARE EDUCATION/TRAINING PROGRAM

## 2024-02-12 PROCEDURE — 51701 INSERT BLADDER CATHETER: CPT

## 2024-02-12 PROCEDURE — 3E033VJ INTRODUCTION OF OTHER HORMONE INTO PERIPHERAL VEIN, PERCUTANEOUS APPROACH: ICD-10-PCS | Performed by: NURSE PRACTITIONER

## 2024-02-12 PROCEDURE — 51702 INSERT TEMP BLADDER CATH: CPT

## 2024-02-12 PROCEDURE — 2500000001 HC RX 250 WO HCPCS SELF ADMINISTERED DRUGS (ALT 637 FOR MEDICARE OP): Performed by: STUDENT IN AN ORGANIZED HEALTH CARE EDUCATION/TRAINING PROGRAM

## 2024-02-12 PROCEDURE — 2500000001 HC RX 250 WO HCPCS SELF ADMINISTERED DRUGS (ALT 637 FOR MEDICARE OP)

## 2024-02-12 RX ORDER — MAGNESIUM SULFATE HEPTAHYDRATE 40 MG/ML
2 INJECTION, SOLUTION INTRAVENOUS ONCE
Status: COMPLETED | OUTPATIENT
Start: 2024-02-12 | End: 2024-02-12

## 2024-02-12 RX ORDER — OXYTOCIN/0.9 % SODIUM CHLORIDE 30/500 ML
2-30 PLASTIC BAG, INJECTION (ML) INTRAVENOUS CONTINUOUS
Status: DISCONTINUED | OUTPATIENT
Start: 2024-02-12 | End: 2024-02-14

## 2024-02-12 RX ORDER — LABETALOL 200 MG/1
600 TABLET, FILM COATED ORAL 2 TIMES DAILY
Status: DISCONTINUED | OUTPATIENT
Start: 2024-02-12 | End: 2024-02-12

## 2024-02-12 RX ORDER — LABETALOL 200 MG/1
600 TABLET, FILM COATED ORAL EVERY 8 HOURS
Status: DISCONTINUED | OUTPATIENT
Start: 2024-02-12 | End: 2024-02-12

## 2024-02-12 RX ORDER — FENTANYL/BUPIVACAINE/NS/PF 2MCG/ML-.1
PLASTIC BAG, INJECTION (ML) INJECTION CONTINUOUS PRN
Status: DISCONTINUED | OUTPATIENT
Start: 2024-02-12 | End: 2024-02-13

## 2024-02-12 RX ORDER — HYDRALAZINE HYDROCHLORIDE 20 MG/ML
10 INJECTION INTRAMUSCULAR; INTRAVENOUS ONCE
Status: COMPLETED | OUTPATIENT
Start: 2024-02-12 | End: 2024-02-12

## 2024-02-12 RX ORDER — DIPHENHYDRAMINE HYDROCHLORIDE 50 MG/ML
25 INJECTION INTRAMUSCULAR; INTRAVENOUS EVERY 6 HOURS PRN
Status: DISCONTINUED | OUTPATIENT
Start: 2024-02-12 | End: 2024-02-14

## 2024-02-12 RX ORDER — LABETALOL 200 MG/1
800 TABLET, FILM COATED ORAL EVERY 8 HOURS
Status: DISCONTINUED | OUTPATIENT
Start: 2024-02-12 | End: 2024-02-16 | Stop reason: HOSPADM

## 2024-02-12 RX ORDER — HYDRALAZINE HYDROCHLORIDE 20 MG/ML
5 INJECTION INTRAMUSCULAR; INTRAVENOUS ONCE
Status: COMPLETED | OUTPATIENT
Start: 2024-02-12 | End: 2024-02-12

## 2024-02-12 RX ORDER — FENTANYL/BUPIVACAINE/NS/PF 2MCG/ML-.1
PLASTIC BAG, INJECTION (ML) INJECTION AS NEEDED
Status: DISCONTINUED | OUTPATIENT
Start: 2024-02-12 | End: 2024-02-13

## 2024-02-12 RX ORDER — LIDOCAINE HCL/EPINEPHRINE/PF 2%-1:200K
VIAL (ML) INJECTION AS NEEDED
Status: DISCONTINUED | OUTPATIENT
Start: 2024-02-12 | End: 2024-02-13

## 2024-02-12 RX ADMIN — MAGNESIUM SULFATE HEPTAHYDRATE 2 G: 40 INJECTION, SOLUTION INTRAVENOUS at 16:34

## 2024-02-12 RX ADMIN — Medication 2 MILLI-UNITS/MIN: at 09:00

## 2024-02-12 RX ADMIN — LIDOCAINE HYDROCHLORIDE,EPINEPHRINE BITARTRATE 3 ML: 20; .005 INJECTION, SOLUTION EPIDURAL; INFILTRATION; INTRACAUDAL; PERINEURAL at 04:50

## 2024-02-12 RX ADMIN — LABETALOL HYDROCHLORIDE 800 MG: 200 TABLET, FILM COATED ORAL at 23:23

## 2024-02-12 RX ADMIN — LABETALOL HYDROCHLORIDE 600 MG: 200 TABLET, FILM COATED ORAL at 14:57

## 2024-02-12 RX ADMIN — Medication 14 ML/HR: at 05:05

## 2024-02-12 RX ADMIN — HYDRALAZINE HYDROCHLORIDE 5 MG: 20 INJECTION INTRAMUSCULAR; INTRAVENOUS at 07:57

## 2024-02-12 RX ADMIN — Medication 5 ML: at 04:54

## 2024-02-12 RX ADMIN — Medication 1 L/MIN: at 13:18

## 2024-02-12 RX ADMIN — SODIUM CHLORIDE, POTASSIUM CHLORIDE, SODIUM LACTATE AND CALCIUM CHLORIDE 500 ML: 600; 310; 30; 20 INJECTION, SOLUTION INTRAVENOUS at 03:34

## 2024-02-12 RX ADMIN — MAGNESIUM SULFATE HEPTAHYDRATE 2 G/HR: 40 INJECTION, SOLUTION INTRAVENOUS at 13:43

## 2024-02-12 RX ADMIN — HYDRALAZINE HYDROCHLORIDE 10 MG: 20 INJECTION INTRAMUSCULAR; INTRAVENOUS at 14:20

## 2024-02-12 RX ADMIN — LABETALOL HYDROCHLORIDE 600 MG: 200 TABLET, FILM COATED ORAL at 09:00

## 2024-02-12 RX ADMIN — Medication 5 ML: at 05:00

## 2024-02-12 RX ADMIN — MAGNESIUM SULFATE HEPTAHYDRATE 2 G/HR: 40 INJECTION, SOLUTION INTRAVENOUS at 01:20

## 2024-02-12 RX ADMIN — MISOPROSTOL 25 MCG: 100 TABLET ORAL at 00:17

## 2024-02-12 RX ADMIN — CLINDAMYCIN PHOSPHATE 900 MG: 900 INJECTION, SOLUTION INTRAVENOUS at 14:42

## 2024-02-12 RX ADMIN — CLINDAMYCIN PHOSPHATE 900 MG: 900 INJECTION, SOLUTION INTRAVENOUS at 05:57

## 2024-02-12 RX ADMIN — MAGNESIUM SULFATE IN WATER 2 G: 40 INJECTION, SOLUTION INTRAVENOUS at 20:45

## 2024-02-12 RX ADMIN — HYDRALAZINE HYDROCHLORIDE 5 MG: 20 INJECTION INTRAMUSCULAR; INTRAVENOUS at 13:54

## 2024-02-12 RX ADMIN — Medication 1 L/MIN: at 16:55

## 2024-02-12 RX ADMIN — DIPHENHYDRAMINE HYDROCHLORIDE 25 MG: 50 INJECTION INTRAMUSCULAR; INTRAVENOUS at 05:56

## 2024-02-12 SDOH — HEALTH STABILITY: MENTAL HEALTH: CURRENT SMOKER: 0

## 2024-02-12 ASSESSMENT — PAIN DESCRIPTION - DESCRIPTORS
DESCRIPTORS: ACHING
DESCRIPTORS: ACHING;HEADACHE
DESCRIPTORS: ACHING

## 2024-02-12 ASSESSMENT — PAIN SCALES - GENERAL
PAINLEVEL_OUTOF10: 2
PAINLEVEL_OUTOF10: 4
PAINLEVEL_OUTOF10: 4
PAINLEVEL_OUTOF10: 2
PAINLEVEL_OUTOF10: 4
PAINLEVEL_OUTOF10: 2
PAINLEVEL_OUTOF10: 0 - NO PAIN
PAINLEVEL_OUTOF10: 2

## 2024-02-12 NOTE — CARE PLAN
The patient's goals for the shift include manage pain and anxiety through delivery.    The clinical goals for the shift include BP management, pain control through labor course.

## 2024-02-12 NOTE — SIGNIFICANT EVENT
AROM    Pt comfortable with epidural infusing.    Cervical Exam  Dilation: 4  Effacement (%): 50  Fetal Station: -3  Method: Manual  OB Examiner: Cathy BARRETO  Fetal Assessment  Movement: Present  Mode: External US  Baseline Fetal Heart Rate (bpm): 135 bpm  Baseline Classification: Normal  Variability: Moderate (Between 6 and 25 BPM)  Pattern: Accelerations  FHR Category: Category I  Multiple Births: No      Contraction Frequency: 1-6    AROM for clear    A/P:  - IOL: s/p CRB and cytox2. Now s/p AROM. For pit start, anticipate start at 0600.  - siPEC w SF: No s/s of Mag toxicity. Cont at 2g/hr. BP well controlled. ALT/AST cont to uptrend, now to 75/55. Plt stable in 140s. Hb stable in 10-11 range. No RUQ pain. Will plan to repeat labs at 0600 with LDH as well. Low concern for HELLP at this time given stable plt and Hb. O2 requirement now resolved, pt on RA.  - cat I tracing  - Clinda for GBS ppx    D/w Dr. Sabi Morgan MD PGY-2

## 2024-02-12 NOTE — PROGRESS NOTES
Intrapartum Progress Note    Assessment/Plan   Nory Rao is a 34 y.o.  at 33w6d. VIVIAN: 3/26/2024, by Last Menstrual Period.     Latent labor:   -s/p  ripening  -s/p AROM 0545  -ctx q 2-3 min; will start pitocin when able or if ctx space  -GBS pos, clinda  -CEFM cat 1 currently    siPEC w/SF  - Diagnosed by severe range BPs requiring IV treatment   - IV tx labetalol 20 (2/10); hydral 5    - Current antihypertensive regimen: labetalol 600mg BID (uptitrated )  - HELLP labs notable for:           ALT/AST 53/45 -> 52/40 -> 64/54 -> 75/55           plt 144 -> 147 -> 143 -> 162 -> 148  - Headache 1/10, treating with tylenol and benadryl > 3/10 2/11, unrelenting > delivery   - Mg gtt  - 1 L O2 requirement while sleeping, will attempt to wean this AM  - mild elevation in LFTs most recent ALT/AST 74/55 (stable)  - plt 145    Postpartum planning:  -contraception: undecided    Seen and discussed with Dr. Prerna Rolon MD PGY-1      Principal Problem:    Severe pre-eclampsia complicating childbirth  Active Problems:    Elevated liver function tests    Pregnancy Problems (from 24 to present)       Problem Noted Resolved    Severe pre-eclampsia complicating childbirth 2/10/2024 by Adilene Sun MD No    Priority:  Medium              Subjective   Pt feeling well.  Epidural infusing    Patient does not report headaches, visual changes, chest pain, shortness of breath or RUQ pain      Objective   Last Vitals:  Temp Pulse Resp BP MAP Pulse Ox   36.9 °C (98.4 °F) 69 18 (!) 141/77   97 %     Vitals Min/Max Last 24 Hours:  Temp  Min: 36.2 °C (97.2 °F)  Max: 37 °C (98.6 °F)  Pulse  Min: 59  Max: 89  Resp  Min: 16  Max: 20  BP  Min: 125/58  Max: 169/83    Intake/Output:    Intake/Output Summary (Last 24 hours) at 2024 0859  Last data filed at 2024 0830  Gross per 24 hour   Intake 1965 ml   Output 2050 ml   Net -85 ml       Physical Examination:  Cervical Exam - SVE deferred at this  time  Dilation: 4  Effacement (%): 50  Fetal Station: -3  Method: Manual  OB Examiner: Cathy BARRETO  Fetal Assessment  Movement: Present  Mode: External US  Baseline Fetal Heart Rate (bpm): 135 bpm  Baseline Classification: Normal  Variability: Moderate (Between 6 and 25 BPM)  Pattern: Accelerations  FHR Category: Category I  Multiple Births: No      Contraction Frequency: 1-5          Lab Review:  Labs in chart were reviewed.

## 2024-02-12 NOTE — CONSULTS
"NICU PRENATAL CONSULT NOTE  Nory Rao is a 34 y.o.  at 33w4d. VIVIAN: 3/26/2024, by Last Menstrual Period. Estimated fetal weight: 5#10oz, 74%ile on 24 . She has had prenatal care with [facility] .    Chief Complaint: HTN    Nory Rao is a 34 y.o.  with VIVIAN 3/26/2024, by Last Menstrual Period presenting with sPEC.    Requesting Physician/Service:  OBGYN   Consulting Physician/Service: NICU    Reason for Consultation: 34.6 weeks IOL for sPEC.     Pregnancy Complications: severe range Bps. Treated with labetalol. On Mg.     Prenatal labs:   Lab Results   Component Value Date    ABO O 2024    LABRH POS 2024    ABSCRN NEG 02/10/2024    RUBIG 95.06 2023     Toxicology:   No results found for: \"AMPHETAMINE\", \"MAMPHBLDS\", \"BARBITURATE\", \"BARBSCRNUR\", \"BENZODIAZ\", \"BENZO\", \"BUPRENBLDS\", \"CANNABBLDS\", \"CANNABINOID\", \"COCBLDS\", \"COCAI\", \"METHABLDS\", \"METH\", \"OXYBLDS\", \"OXYCODONE\", \"PCPBLDS\", \"PCP\", \"OPIATBLDS\", \"OPIATE\", \"FENTANYL\", \"DRBLDCOMM\"  Labs:  Lab Results   Component Value Date    GRPBSTREP No Group B Streptococcus (GBS) isolated 02/10/2024    HIV1X2  2023     NONREACTIVE  Reference range: NONREACTIVE     HIV Ag/Ab screen is performed using the [employer]   HIV Ag/Ab Combo assay which detects the presence of HIV    p24 antigen as well as antibodies to HIV-1   (Group M and O) and HIV-2.  .  No laboratory evidence of HIV infection. If acute HIV infection is   suspected, consider testing for HIV RNA by PCR (viral load).  Test performed at:  [facility] [address] [address] [facility] [address]      HEPBSAG Nonreactive 2024    HEPCAB Nonreactive 2024    CHLAMTRACAMP  2023     Negative for Chlamydia Trachomatis DNA by Amplification    RPR NONREACTIVE 2023    SYPHT Nonreactive 02/10/2024     Fetal Imaging:  No results found for this or any previous visit.    Medical History  She has no past medical history on file.     Family History  No family history on file. "     Social History  She reports that she has never smoked. She has never used smokeless tobacco. No history on file for alcohol use and drug use.      Assessment/Recommendations:  Resuscitation and ICN care plans and expectations for hospital course were discussed including:  RDS/BPD/Ventilator, OG feedings/NEC/use of breastmilk , risk of infection, expected length of stay, and apnea/monitoring    .Spoke with Nory regarding her diagnosis and possibility of premature delivery/possible NICU admission.  She expressed she is nervous about her admission and length of stay. She shares her support system to be her , parents and husbands aunt who is a neonatologist. Provided active listening. Will discuss her concerns with the OB team with her consent.    Patient made aware that Neonatology will be present for delivery and that we remain available for any questions/concerns that might arise. Thank you.     Electronically signed by:  Leon Griffin MD

## 2024-02-12 NOTE — ANESTHESIA PROCEDURE NOTES
Epidural Block    Patient location during procedure: OB  Start time: 2/12/2024 4:39 AM  Reason for block: labor analgesia  Staffing  Performed: resident   Authorized by: Sudhakar Barnes DO    Performed by: Citlali Márquez MD    Preanesthetic Checklist  Completed: patient identified, IV checked, risks and benefits discussed, surgical consent, pre-op evaluation, timeout performed and sterile techniques followed  Block Timeout  RN/Licensed healthcare professional reads aloud to the Anesthesia provider and entire team: Patient identity, procedure with side and site, patient position, and as applicable the availability of implants/special equipment/special requirements.  Patient on coagulant treatment: no  Timeout performed at: 2/12/2024 4:39 AM  Block Placement  Patient position: sitting  Prep: ChloraPrep  Sterility prep: cap, drape, gloves and hand  Sedation level: no sedation  Patient monitoring: blood pressure, continuous pulse oximetry and heart rate  Approach: midline  Local numbing: lidocaine 1% to skin and subcutaneous tissues  Vertebral space: lumbar  Lumbar location: L3-L4  Epidural  Loss of resistance technique: saline  Guidance: landmark technique        Needle  Needle type: Tuohy   Needle gauge: 17  Needle length: 9 cm  Needle insertion depth: 7 cm  Catheter type: multi-orifice  Catheter size: 19 G  Catheter at skin depth: 12 cm  Catheter securement method: clear occlusive dressing and liquid medical adhesive    Test dose: lidocaine 1.5% with epinephrine 1-to-200,000  Test dose: lidocaine 1.5% with epinephrine 1-to-200,000    PCEA  Medication concentration used: 0.044% Bupivacaine with 1.25 mcg/mL Fentanyl and 1:[id number] Epinephrine  Dose (mL): 10  Lockout (minutes): 15  1-Hour Limit (boluses/hr): 4  Basal Rate: 14        Assessment  Sensory level: T10 bilateral  Block outcome: patient comfortable  Number of attempts: 1  Procedure assessment: patient tolerated procedure well with no immediate  complications

## 2024-02-12 NOTE — ANESTHESIA PREPROCEDURE EVALUATION
Patient: Nory Rao    Evaluation Method: In-person visit    Procedure Information    Date: 02/11/24  Procedure: Labor Consult         Relevant Problems   Anesthesia (within normal limits)      Cardiovascular   (+) Severe pre-eclampsia complicating childbirth      Endocrine (within normal limits)      GI (within normal limits)      /Renal (within normal limits)      Neuro/Psych (within normal limits)      Pulmonary (within normal limits)      GI/Hepatic   (+) Elevated liver function tests      Hematology (within normal limits)      Musculoskeletal (within normal limits)      Eyes, Ears, Nose, and Throat (within normal limits)       Clinical information reviewed:   Tobacco  Allergies  Meds   Med Hx  Surg Hx   Fam Hx          NPO Detail:  No data recorded     OB/Gyn Evaluation    Present Pregnancy    Patient is pregnant now.  (+) , hypertensive disorder of pregnancy   Obstetric History                Physical Exam    Airway  Mallampati: II  TM distance: >3 FB  Neck ROM: full     Cardiovascular - normal exam  Rhythm: regular  Rate: normal     Dental - normal exam     Pulmonary - normal exam  Breath sounds clear to auscultation     Abdominal            Anesthesia Plan    History of general anesthesia?: yes  History of complications of general anesthesia?: no    ASA 3     epidural     The patient is not a current smoker.  Patient did not smoke on day of procedure.    Anesthetic plan and risks discussed with patient.  Use of blood products discussed with patient who consented to blood products.    Plan discussed with attending.

## 2024-02-12 NOTE — SIGNIFICANT EVENT
IUPC placed for difficulty tracing contractions    Cervical Exam  Dilation: 4  Effacement (%): 60  Fetal Station: -3  Method: Manual  OB Examiner: Clifford BARRETO  Fetal Assessment  Movement: Present  Mode: Fetal scalp electrode  Baseline Fetal Heart Rate (bpm): 130 bpm  Baseline Classification: Normal  Variability: Moderate (Between 6 and 25 BPM)  Pattern: Accelerations  FHR Category: Category I  Multiple Births: No      Contraction Frequency: 1-4        Latent labor  -continue pitocin  - FSE and iupc in place  -cat 1 FHT  -epidural infusing  - GBS + , radha Rolon MD PGY-1

## 2024-02-12 NOTE — SIGNIFICANT EVENT
Cyto #2    Pt feeling crampy pain. Denies scotoma, RUQ pain, SOB, chest pain. HA is 2/10 still.    Exam:  Constitutional: No visible distress, alert and cooperative  Respiratory/Thorax: Normal respiratory effort on RA.   Cardiovascular: Reg rate  Gastrointestinal: soft, nondistended, nontender including in RUQ  Neurological: 2+ DTR in bilat UE  Psychological: Appropriate mood and behavior    CRB in place. 2nd cyto placed.    FHT:  Baseline 130, pos accels, neg decels, mod su  [place]: acontractile    A/P:  - IOL: 2nd cyto placed.   - siPEC w SF: No s/s of Mag toxicity. Cont at 2g/hr. BP well controlled. ALT/AST cont to uptrend, now to 75/55. Plt stable in 140s. Hb stable in 10-11 range. No RUQ pain. Will plan to repeat labs in AM with LDH at that time as well. Low concern for HELLP at this time given stable plt and Hb.  - cat I tracing  - Clinda for GBS ppx    D/w Dr. Ruth Morgan MD PGY-2

## 2024-02-12 NOTE — PROGRESS NOTES
Intrapartum Progress Note    Assessment/Plan   Nory Rao is a 34 y.o.  at 33w5d by OSH dating. VIVIAN: 3/26/2024, by Last Menstrual Period.     siPEC w/SF   - Diagnosed by severe range BPs requiring IV treatment   - Last treated at 0200 2/10 with labetalol 20mg  - Current antihypertensive regimen: labetalol 400mg BID (uptitrated 2/10)  - Normotensive to mild range over last few hrs  - HELLP labs notable for following trends:  ALT/AST 53/45 -> 52/40 -> 64/54   platelets 144 -> 147 -> 143 -> 162  - Will obtain another set of HELLP labs at this time to continue to trend  - Headache 1/10, treating with tylenol and benadryl > 3/10 2/11, unrelenting headache . Currently 2/10, will try tylenol for symptomatic relief    IOL  - Unstable lie, scanned before IOL started and cephalic  - CRB placed 1600  - cyto #1 placed 2100    Fetal status   - cEFM, currently cat I  - Ultrasound: cephalic, EFW 24 at 74%   - BMZ -10     Routine  - GBS pending 2/10, high risk PCN/vanc allergy, for sensitivities. Will discuss allergy with patient and determine appropriate Abx ppx.  - 1hr 104  - BCM: will address     Pt seen and d/w Dr. Ruth Morgan MD PGY-2      Principal Problem:    Severe pre-eclampsia complicating childbirth  Active Problems:    Elevated liver function tests      Subjective   Patient reporting cramping with CRB in place. Denies scotoma, RUQ pain, SOB, chest pain. HA is now 2/10.      Objective   Last Vitals:  Temp Pulse Resp BP MAP Pulse Ox   36.3 °C (97.3 °F) 74 16 139/82   97 %     Vitals Min/Max Last 24 Hours:  Temp  Min: 36 °C (96.8 °F)  Max: 36.8 °C (98.2 °F)  Pulse  Min: 59  Max: 84  Resp  Min: 16  Max: 20  BP  Min: 123/71  Max: 159/95    Intake/Output:    Intake/Output Summary (Last 24 hours) at 2024  Last data filed at 2024  Gross per 24 hour   Intake 682 ml   Output 0 ml   Net 682 ml       Physical Examination:  Constitutional: No visible distress, alert and  "cooperative  Respiratory/Thorax: Normal respiratory effort on RA. Lungs CTAB.  Cardiovascular: RRR  Gastrointestinal: soft, nondistended, nontender, gravid  Neurological: 2+ DTR in bilat UE  Psychological: Appropriate mood and behavior    CRB in place.  Cyto #1 placed.    FHT:  Baseline 135, pos accels, neg decels, mod su  [place]: acontractile    Lab Review:  Lab Results   Component Value Date    WBC 9.5 02/11/2024    HGB 10.7 (L) 02/11/2024    HCT 33.5 (L) 02/11/2024     (L) 02/11/2024     Lab Results   Component Value Date    GLUCOSE 108 (H) 02/10/2024     (L) 02/10/2024    K 3.6 02/10/2024     02/10/2024    CO2 22 02/10/2024    ANIONGAP 13 02/10/2024    BUN 8 02/10/2024    CREATININE 0.62 02/10/2024    EGFR >90 02/10/2024    CALCIUM 8.9 02/10/2024    ALBUMIN 3.1 (L) 02/10/2024    PROT 5.8 (L) 02/10/2024    ALKPHOS 113 (H) 02/10/2024    ALT 64 (H) 02/10/2024    AST 54 (H) 02/10/2024    BILITOT 0.5 02/10/2024     No results found for: \"UTPCR\"  "

## 2024-02-12 NOTE — SIGNIFICANT EVENT
CRB out    - CRB out  - Pt desires epidural before AROM  - For pitocin  - O2 requirement: On 1L NC while sleeping, asymptomatic. Desatted to 92 at lowest. Lungs CTAB. Low c/f pulm edema at this time but will obtain CXR if uptitrating O2 or if cannot wean while pt awake.  - cat I tracing    D/w Dr. Ruth Morgan MD PGY-2

## 2024-02-12 NOTE — SIGNIFICANT EVENT
"Labor Progress Note    Subjective:   Patient resting comfortably in bed. At bedside for difficulty tracing FHR     Objective:  Vitals:  BP (!) 149/78   Pulse 70   Temp 36.9 °C (98.4 °F) (Temporal)   Resp 18   Ht 1.646 m (5' 4.8\")   Wt 134 kg (294 lb 8.6 oz)   LMP 06/20/2023   SpO2 96%   BMI 49.32 kg/m²   Cervical Exam  Dilation: 4  Effacement (%): 50  Fetal Station: -3  Method: Manual  OB Examiner: Gonzales BARRETO  Fetal Assessment  Movement: Present  Mode: External US  Baseline Fetal Heart Rate (bpm): 135 bpm  Baseline Classification: Normal  Variability: Moderate (Between 6 and 25 BPM)  Pattern: Accelerations  FHR Category: Category I  Multiple Births: No      Contraction Frequency: 1-4    FSE placed without difficulty    A/P:    Latent labor:   - Titrate pitocin per protocol, currently at 28  - FSE placed; cat1  - Epidural infusing  - GBS+, clinda    Discussed with Dr. Lalo Rolon MD PGY-1      "

## 2024-02-13 LAB
ALBUMIN SERPL BCP-MCNC: 3 G/DL (ref 3.4–5)
ALP SERPL-CCNC: 137 U/L (ref 33–110)
ALT SERPL W P-5'-P-CCNC: 70 U/L (ref 7–45)
ANION GAP SERPL CALC-SCNC: 16 MMOL/L (ref 10–20)
AST SERPL W P-5'-P-CCNC: 52 U/L (ref 9–39)
BILIRUB SERPL-MCNC: 0.9 MG/DL (ref 0–1.2)
BLOOD EXPIRATION DATE: NORMAL
BUN SERPL-MCNC: 9 MG/DL (ref 6–23)
CALCIUM SERPL-MCNC: 8.7 MG/DL (ref 8.6–10.6)
CHLORIDE SERPL-SCNC: 99 MMOL/L (ref 98–107)
CO2 SERPL-SCNC: 21 MMOL/L (ref 21–32)
CREAT SERPL-MCNC: 0.69 MG/DL (ref 0.5–1.05)
DISPENSE STATUS: NORMAL
EGFRCR SERPLBLD CKD-EPI 2021: >90 ML/MIN/1.73M*2
ERYTHROCYTE [DISTWIDTH] IN BLOOD BY AUTOMATED COUNT: 14 % (ref 11.5–14.5)
GLUCOSE SERPL-MCNC: 111 MG/DL (ref 74–99)
HCT VFR BLD AUTO: 35.3 % (ref 36–46)
HGB BLD-MCNC: 11.8 G/DL (ref 12–16)
MAGNESIUM SERPL-MCNC: 4.83 MG/DL (ref 1.6–2.4)
MCH RBC QN AUTO: 29.5 PG (ref 26–34)
MCHC RBC AUTO-ENTMCNC: 33.4 G/DL (ref 32–36)
MCV RBC AUTO: 88 FL (ref 80–100)
NRBC BLD-RTO: 0.1 /100 WBCS (ref 0–0)
PLATELET # BLD AUTO: 179 X10*3/UL (ref 150–450)
POTASSIUM SERPL-SCNC: 4.3 MMOL/L (ref 3.5–5.3)
PRODUCT BLOOD TYPE: 5100
PRODUCT CODE: NORMAL
PROT SERPL-MCNC: 6 G/DL (ref 6.4–8.2)
RBC # BLD AUTO: 4 X10*6/UL (ref 4–5.2)
SODIUM SERPL-SCNC: 132 MMOL/L (ref 136–145)
UNIT ABO: NORMAL
UNIT NUMBER: NORMAL
UNIT RH: NORMAL
UNIT VOLUME: 306
WBC # BLD AUTO: 16 X10*3/UL (ref 4.4–11.3)
XM INTEP: NORMAL

## 2024-02-13 PROCEDURE — 59409 OBSTETRICAL CARE: CPT | Performed by: OBSTETRICS & GYNECOLOGY

## 2024-02-13 PROCEDURE — 85027 COMPLETE CBC AUTOMATED: CPT

## 2024-02-13 PROCEDURE — 0W3R7ZZ CONTROL BLEEDING IN GENITOURINARY TRACT, VIA NATURAL OR ARTIFICIAL OPENING: ICD-10-PCS | Performed by: OBSTETRICS & GYNECOLOGY

## 2024-02-13 PROCEDURE — 2500000005 HC RX 250 GENERAL PHARMACY W/O HCPCS

## 2024-02-13 PROCEDURE — 1100000001 HC PRIVATE ROOM DAILY

## 2024-02-13 PROCEDURE — 7210000002 HC LABOR PER HOUR

## 2024-02-13 PROCEDURE — 99199 UNLISTED SPECIAL SVC PX/RPRT: CPT

## 2024-02-13 PROCEDURE — 2500000001 HC RX 250 WO HCPCS SELF ADMINISTERED DRUGS (ALT 637 FOR MEDICARE OP)

## 2024-02-13 PROCEDURE — 2500000004 HC RX 250 GENERAL PHARMACY W/ HCPCS (ALT 636 FOR OP/ED)

## 2024-02-13 PROCEDURE — 0KQM0ZZ REPAIR PERINEUM MUSCLE, OPEN APPROACH: ICD-10-PCS | Performed by: OBSTETRICS & GYNECOLOGY

## 2024-02-13 PROCEDURE — 2500000002 HC RX 250 W HCPCS SELF ADMINISTERED DRUGS (ALT 637 FOR MEDICARE OP, ALT 636 FOR OP/ED)

## 2024-02-13 PROCEDURE — 59899 UNLISTED PX MAT CARE&DLVR: CPT | Performed by: OBSTETRICS & GYNECOLOGY

## 2024-02-13 PROCEDURE — 88307 TISSUE EXAM BY PATHOLOGIST: CPT | Mod: TC,SUR

## 2024-02-13 PROCEDURE — 2500000004 HC RX 250 GENERAL PHARMACY W/ HCPCS (ALT 636 FOR OP/ED): Performed by: STUDENT IN AN ORGANIZED HEALTH CARE EDUCATION/TRAINING PROGRAM

## 2024-02-13 PROCEDURE — 7220000003 HC JADA OB SPECIAL CARE, 4 HOURS

## 2024-02-13 PROCEDURE — 2500000004 HC RX 250 GENERAL PHARMACY W/ HCPCS (ALT 636 FOR OP/ED): Performed by: ANESTHESIOLOGIST ASSISTANT

## 2024-02-13 PROCEDURE — 59050 FETAL MONITOR W/REPORT: CPT

## 2024-02-13 PROCEDURE — 84075 ASSAY ALKALINE PHOSPHATASE: CPT

## 2024-02-13 PROCEDURE — 2720000007 HC OR 272 NO HCPCS

## 2024-02-13 PROCEDURE — 7100000016 HC LABOR RECOVERY PER HOUR

## 2024-02-13 PROCEDURE — 88307 TISSUE EXAM BY PATHOLOGIST: CPT | Performed by: STUDENT IN AN ORGANIZED HEALTH CARE EDUCATION/TRAINING PROGRAM

## 2024-02-13 RX ORDER — LABETALOL HYDROCHLORIDE 5 MG/ML
20 INJECTION, SOLUTION INTRAVENOUS ONCE
Status: COMPLETED | OUTPATIENT
Start: 2024-02-13 | End: 2024-02-13

## 2024-02-13 RX ORDER — HYDRALAZINE HYDROCHLORIDE 20 MG/ML
INJECTION INTRAMUSCULAR; INTRAVENOUS
Status: COMPLETED
Start: 2024-02-13 | End: 2024-02-13

## 2024-02-13 RX ORDER — CLINDAMYCIN PHOSPHATE 900 MG/50ML
900 INJECTION, SOLUTION INTRAVENOUS EVERY 8 HOURS
Status: DISCONTINUED | OUTPATIENT
Start: 2024-02-13 | End: 2024-02-13

## 2024-02-13 RX ORDER — HYDRALAZINE HYDROCHLORIDE 20 MG/ML
5 INJECTION INTRAMUSCULAR; INTRAVENOUS ONCE
Status: COMPLETED | OUTPATIENT
Start: 2024-02-13 | End: 2024-02-13

## 2024-02-13 RX ORDER — LABETALOL HYDROCHLORIDE 5 MG/ML
40 INJECTION, SOLUTION INTRAVENOUS ONCE
Status: COMPLETED | OUTPATIENT
Start: 2024-02-13 | End: 2024-02-13

## 2024-02-13 RX ORDER — NIFEDIPINE 30 MG/1
30 TABLET, FILM COATED, EXTENDED RELEASE ORAL ONCE
Status: COMPLETED | OUTPATIENT
Start: 2024-02-13 | End: 2024-02-13

## 2024-02-13 RX ORDER — NIFEDIPINE 30 MG/1
30 TABLET, FILM COATED, EXTENDED RELEASE ORAL EVERY 24 HOURS
Status: DISCONTINUED | OUTPATIENT
Start: 2024-02-13 | End: 2024-02-14

## 2024-02-13 RX ORDER — ENOXAPARIN SODIUM 100 MG/ML
60 INJECTION SUBCUTANEOUS EVERY 24 HOURS
Status: DISCONTINUED | OUTPATIENT
Start: 2024-02-14 | End: 2024-02-14

## 2024-02-13 RX ORDER — FENTANYL CITRATE 50 UG/ML
INJECTION, SOLUTION INTRAMUSCULAR; INTRAVENOUS AS NEEDED
Status: DISCONTINUED | OUTPATIENT
Start: 2024-02-13 | End: 2024-02-13

## 2024-02-13 RX ADMIN — HYDRALAZINE HYDROCHLORIDE 5 MG: 20 INJECTION INTRAMUSCULAR; INTRAVENOUS at 13:28

## 2024-02-13 RX ADMIN — NIFEDIPINE 30 MG: 30 TABLET, FILM COATED, EXTENDED RELEASE ORAL at 15:51

## 2024-02-13 RX ADMIN — LOPERAMIDE HYDROCHLORIDE 4 MG: 2 CAPSULE ORAL at 09:51

## 2024-02-13 RX ADMIN — ONDANSETRON 4 MG: 2 INJECTION INTRAMUSCULAR; INTRAVENOUS at 03:27

## 2024-02-13 RX ADMIN — LOPERAMIDE HYDROCHLORIDE 4 MG: 2 CAPSULE ORAL at 11:30

## 2024-02-13 RX ADMIN — ONDANSETRON 4 MG: 2 INJECTION INTRAMUSCULAR; INTRAVENOUS at 19:21

## 2024-02-13 RX ADMIN — LABETALOL HYDROCHLORIDE 800 MG: 200 TABLET, FILM COATED ORAL at 09:28

## 2024-02-13 RX ADMIN — Medication 1 L/MIN: at 06:42

## 2024-02-13 RX ADMIN — CLINDAMYCIN PHOSPHATE 900 MG: 900 INJECTION, SOLUTION INTRAVENOUS at 08:06

## 2024-02-13 RX ADMIN — LIDOCAINE HYDROCHLORIDE 5 ML: 10 INJECTION, SOLUTION INFILTRATION; PERINEURAL at 06:23

## 2024-02-13 RX ADMIN — LABETALOL HYDROCHLORIDE 800 MG: 200 TABLET, FILM COATED ORAL at 18:29

## 2024-02-13 RX ADMIN — NIFEDIPINE 30 MG: 30 TABLET, FILM COATED, EXTENDED RELEASE ORAL at 12:56

## 2024-02-13 RX ADMIN — CARBOPROST TROMETHAMINE 250 MCG: 250 INJECTION, SOLUTION INTRAMUSCULAR at 09:20

## 2024-02-13 RX ADMIN — FENTANYL CITRATE 100 MCG: 50 INJECTION, SOLUTION INTRAMUSCULAR; INTRAVENOUS at 06:23

## 2024-02-13 RX ADMIN — LABETALOL HYDROCHLORIDE 20 MG: 5 INJECTION, SOLUTION INTRAVENOUS at 04:10

## 2024-02-13 RX ADMIN — MISOPROSTOL 800 MCG: 200 TABLET ORAL at 09:25

## 2024-02-13 RX ADMIN — SODIUM CHLORIDE, POTASSIUM CHLORIDE, SODIUM LACTATE AND CALCIUM CHLORIDE 75 ML/HR: 600; 310; 30; 20 INJECTION, SOLUTION INTRAVENOUS at 20:54

## 2024-02-13 RX ADMIN — TRANEXAMIC ACID 1000 MG: 100 INJECTION, SOLUTION INTRAVENOUS at 09:32

## 2024-02-13 RX ADMIN — LOPERAMIDE HYDROCHLORIDE 4 MG: 2 CAPSULE ORAL at 20:30

## 2024-02-13 RX ADMIN — MAGNESIUM SULFATE HEPTAHYDRATE 2 G/HR: 40 INJECTION, SOLUTION INTRAVENOUS at 14:13

## 2024-02-13 RX ADMIN — LABETALOL HYDROCHLORIDE 40 MG: 5 INJECTION, SOLUTION INTRAVENOUS at 06:34

## 2024-02-13 RX ADMIN — MAGNESIUM SULFATE HEPTAHYDRATE 2 G/HR: 40 INJECTION, SOLUTION INTRAVENOUS at 00:32

## 2024-02-13 RX ADMIN — LOPERAMIDE HYDROCHLORIDE 4 MG: 2 CAPSULE ORAL at 18:30

## 2024-02-13 RX ADMIN — NIFEDIPINE 30 MG: 30 TABLET, FILM COATED, EXTENDED RELEASE ORAL at 04:10

## 2024-02-13 RX ADMIN — MAGNESIUM SULFATE HEPTAHYDRATE 2 G/HR: 40 INJECTION, SOLUTION INTRAVENOUS at 23:57

## 2024-02-13 ASSESSMENT — PAIN SCALES - GENERAL
PAINLEVEL_OUTOF10: 5 - MODERATE PAIN
PAINLEVEL_OUTOF10: 0 - NO PAIN
PAINLEVEL_OUTOF10: 5 - MODERATE PAIN
PAINLEVEL_OUTOF10: 0 - NO PAIN
PAINLEVEL_OUTOF10: 2
PAINLEVEL_OUTOF10: 0 - NO PAIN
PAINLEVEL_OUTOF10: 5 - MODERATE PAIN
PAINLEVEL_OUTOF10: 3
PAINLEVEL_OUTOF10: 7
PAINLEVEL_OUTOF10: 0 - NO PAIN
PAINLEVEL_OUTOF10: 5 - MODERATE PAIN

## 2024-02-13 ASSESSMENT — PAIN DESCRIPTION - DESCRIPTORS
DESCRIPTORS: CRAMPING
DESCRIPTORS: CRAMPING

## 2024-02-13 NOTE — SIGNIFICANT EVENT
Feeling increased pressure.     Cervical Exam  Dilation: 9  Effacement (%): 90  Fetal Station: 0  Method: Manual  OB Examiner: Cathy BARRETO  Fetal Assessment  Movement: Present  Mode: Fetal scalp electrode  Baseline Fetal Heart Rate (bpm): 135 bpm  Baseline Classification: Normal  Variability: Moderate (Between 6 and 25 BPM)  Pattern: Variable decelerations  FHR Category: Category I  Multiple Births: No      Contraction Frequency: 1.5-3    - In active labor, making adequate change  - CEFM, cat 1 currently  - epidural infusing  - continue pitocin  - anticipate vaginal delivery  - 1L O2 while sleeping -> weaned while awake  - will repeat HELLP labs now to continue to trend LFTs    D/w Dr. Soto Celestin MD PGY-4  Obstetrics and Gynecology

## 2024-02-13 NOTE — SIGNIFICANT EVENT
SVE    Pt feeling constant pain in R inguinal region. Also feeling rectal pressure.    Cervical Exam  Dilation: 9.5  Effacement (%): 90  Fetal Station: 0  Method: Manual  OB Examiner: Sabi BARRETO  Fetal Assessment  Movement: Present  Mode: Fetal scalp electrode  Baseline Fetal Heart Rate (bpm): 135 bpm  Baseline Classification: Normal  Variability: Moderate (Between 6 and 25 BPM)  Pattern: Variable decelerations  FHR Category: Category I  Multiple Births: No      Contraction Frequency: 1.5-3      A/P:  - Active labor, making cervical change  - cat I tracing  - Will contact anesthesia for epidural eval given R sided pain    D/w Dr. Sabi Morgan MD PGY-2

## 2024-02-13 NOTE — SIGNIFICANT EVENT
Laura Assessment    To bedside to assess Laura.     40cc blood in canister+tubing -> Laura deflated and turned off suction at 11:15  No bleeding noted around Laura    1hr later Laura reassessed, no additional bleeding noted, fundus firm.   Laura removed with gentle traction.    Fundus again felt to be firm, no additional vaginal bleeding after removal.    Continue routine postpartum care + postpartum magnesium infusion.    Latoya Horton MD  PGY-2, Obstetrics and Gynecology

## 2024-02-13 NOTE — LACTATION NOTE
This note was copied from a baby's chart.  Lactation Consultant Note     Maternal Information   Mom on magnesium sulfate drip on L&D.   Recommendations/Summary  Met with Dad. Explained availability of LC services. He reports mom has not started to pump. Encouraged to encourage mom to initiate pumping/ hand expression. Dad currently providing PAOLO.  Invited to contact LC services as needed.

## 2024-02-13 NOTE — PROGRESS NOTES
"Intrapartum Progress Note    Assessment/Plan   Nory Rao is a 34 y.o.  at 33w6d. VIVIAN: 3/26/2024, by Last Menstrual Period.     IOL  - s/p ripening  - s/p AROM 0545  - Pit on at 0900  - GBS pos, clinda  - CEFM cat 1 currently     siPEC w/SF  - Diagnosed by severe range BPs requiring IV treatment   - IV tx labetalol 20 (2/10); hydral 5/5/10 2/12   - Current antihypertensive regimen: labetalol 600mg TID (uptitrated ) --> will increase to lab 800 TID at this time given high mild range BPs  - HELLP labs notable for:           ALT/AST 53/45 -> 52/40 -> 64/54 -> 75/55 -> 81/61           plt 144 -> 147 -> 143 -> 162 -> 148 -> 158  - Headache 1/10, treating with tylenol and benadryl > 3/10 2/11, unrelenting > delivery   - Mg gtt at 2/hr: concern earlier in day for clonus, Mg level obtained at 1145, subtherapeutic at 4.39. Given 2g Mg bolus, 2 hrs after bolus Mg level 4.66.  - Reflexes diminished to 1+ in bilat UE however suspect this is due to worsening edema in extremities. Trending Mg levels as above.  - 1 L O2 requirement while sleeping, weaned off O2 while awake. Lungs clear. Low c/f pulm edema, will obtain CXR if worsening O2 requirement or new clinical signs     Postpartum planning  - contraception: declines    Pt seen and d/w Dr. Soto Morgan MD PGY-2      Principal Problem:    Severe pre-eclampsia complicating childbirth  Active Problems:    Elevated liver function tests      Subjective   Patient resting comfortably in bed. States she feels \"off\" and describes this as a worse HA: it is now 4/10. In same distribution as before (frontal). Denies scotoma, RUQ pain, SOB, chest pain. Denies N/V.      Objective   Last Vitals:  Temp Pulse Resp BP MAP Pulse Ox   36.6 °C (97.9 °F) 75 18 (!) 150/81   98 %     Vitals Min/Max Last 24 Hours:  Temp  Min: 36.4 °C (97.5 °F)  Max: 37 °C (98.6 °F)  Pulse  Min: 60  Max: 89  Resp  Min: 18  Max: 20  BP  Min: 125/58  Max: " "176/86    Intake/Output:    Intake/Output Summary (Last 24 hours) at 2/12/2024 1955  Last data filed at 2/12/2024 1924  Gross per 24 hour   Intake 4543.04 ml   Output 4000 ml   Net 543.04 ml       Physical Examination:  Constitutional: No visible distress, alert and cooperative  Respiratory/Thorax: Normal respiratory effort on RA. Lungs CTAB.  Cardiovascular: RRR  Gastrointestinal: soft, nondistended, nontender, gravid  Neurological: 1+ DTR in bilat UE  Psychological: Appropriate mood and behavior  UOP adequate.      Lab Review:  Lab Results   Component Value Date    WBC 10.6 02/12/2024    HGB 11.2 (L) 02/12/2024    HCT 35.4 (L) 02/12/2024     02/12/2024     Lab Results   Component Value Date    GLUCOSE 62 (L) 02/12/2024     02/12/2024    K 3.9 02/12/2024     02/12/2024    CO2 23 02/12/2024    ANIONGAP 14 02/12/2024    BUN 8 02/12/2024    CREATININE 0.57 02/12/2024    EGFR >90 02/12/2024    CALCIUM 8.8 02/12/2024    ALBUMIN 3.4 02/12/2024    PROT 6.3 (L) 02/12/2024    ALKPHOS 123 (H) 02/12/2024    ALT 81 (H) 02/12/2024    AST 61 (H) 02/12/2024    BILITOT 0.6 02/12/2024     No results found for: \"UTPCR\"  "

## 2024-02-13 NOTE — L&D DELIVERY NOTE
I was present for the key portions of this procedure.  Cirilo Escalona MD 2024 3:10 PM      OB Delivery Note  2024  Nory Rao  34 y.o.   Vaginal, Spontaneous        Gestational Age: 34w0d  /Para:   Quantitative Blood Loss: Admission to Discharge: 300 mL (2/10/2024 12:03 AM - 2024  3:10 PM)    Fidelia Rao [[id number]]      Labor Events    Rupture date/time: 2024 0536  Rupture type: Artificial  Fluid color: Clear  Fluid odor: None  Labor type: Induced Onset of Labor  Labor allowed to proceed with plans for an attempted vaginal birth?: Yes  Induction: Oxytocin  First cervical ripening date/time: 2024  Induction date/time: 2024  Induction indications: Hypertensive Disorder of Pregnancy  Complications: Uterine Atony       Labor Event Times    Labor onset date/time: 2024  Dilation complete date/time: 202438  Start pushing date/time: 2024 0739       Labor Length    1st stage: 31h 21m  2nd stage: 1h 27m  3rd stage: 0h 03m       Placenta    Placenta delivery date/time: 2024 0908  Placenta removal: Spontaneous  Placenta appearance: Intact  Placenta disposition: pathology       Cord    Vessels: 3 vessels  Complications: None  Delayed cord clamping?: Yes  Cord clamped date/time: 2024 0906  Cord blood disposition: Lab  Gases sent?: Yes       Lacerations    Episiotomy: None  Perineal laceration: 2nd  Other lacerations?: No  Repair suture: 3-0 Synthetic Suture       Anesthesia    Method: Epidural       Operative Delivery    Forceps attempted?: No  Vacuum extractor attempted?: No       Shoulder Dystocia    Shoulder dystocia present?: No        Delivery    Time head delivered: 2024 09:05:00  Birth date/time: [birth year]24 09:05:00  Delivery type: Vaginal, Spontaneous  Complications: Uterine Atony       Resuscitation    Method: Suctioning, Supplemental oxygen, Continuous positive airway pressure (CPAP)       Apgars    Living status:  Living  Apgar Component Scores:  1 min.:  5 min.:  10 min.:  15 min.:  20 min.:    Skin color:  1  1       Heart rate:  1  2       Reflex irritability:  2  2       Muscle tone:  2  2       Respiratory effort:  0  2       Total:  6  9       Apgars assigned by: JAKE DELACRUZ       Delivery Providers    Delivering clinician: Cirilo Escalona MD   Provider Role    Renée Smith, RN Delivery Nurse    Nirmala Good, RN Nursery Nurse    Latoya Horton MD Resident                 Following delivery of the infant, Patient was noted to have slow persistent bleeding. Bimanual exam was performed with evacuation of small amount of membranes. Hemabate, cytotec, and TXA were given for moderate uterine atony. Multiple bimanual sweeps were performed with removal of small amount of clot. Given persistent atony despite uterotonics, decision was made to place a Laura device.     Laura device was placed in the uterus. The balloon was inflated with 120cc saline and the device was turned to suction. Minimal return of blood was noted in the tubing. No bleeding around the device was noted.     Device to be evaluated in 1 hr.    Cirilo Escalona MD

## 2024-02-13 NOTE — PROGRESS NOTES
Postpartum Progress Note    Assessment/Plan   Nory Rao is a 34 y.o., , who delivered at 34w0d gestation and is now postpartum day 0.    s/p  on    - c/b uterine atony: hemabate, cytotec, and TXA given, Laura placed -> removed at approx 1230  - Total  (300 from delivery + 50 in Laura tubing)  - Hb before delivery 11.9 -> 11.8 at time of Laura removal.   - continue routine postpartum care  - pain well controlled on po medications    siPEC w/SF  - Diagnosed by severe range BPs requiring IV treatment   - IV tx labetalol 20 (2/10); hydral 5/10    - Current antihypertensive regimen: labetalol 800mg TID + nifed 90 ()  - HELLP labs notable for:           ALT/AST elevated, peaked at 84/64 -> now down to 70/52           plt 144 -> 147 -> 143 -> 162 -> 148 -> 158  - Next labs  am  - Unrelenting headache prior to delivery, now resolved  - Mg gtt at 2/hr, no current s/s of Mg toxicity. Cont for 24 hrs postpartum.  - 1 L O2 requirement while sleeping, weaned off O2 while awake. Lungs clear. Low c/f pulm edema, will obtain CXR if worsening O2 requirement or new clinical signs    Contraception  - Declines    Dispo  - appropriate for d/c on PPD #3 if remains stable  - for f/u 1month with Primary OB provider    Pt seen and d/w Dr. Gisel Morgan MD PGY-2      Principal Problem:    Severe pre-eclampsia complicating childbirth  Active Problems:    Elevated liver function tests      Subjective   Patient resting comfortably in bed. Denies HA, scotoma, RUQ pain, SOB, chest pain. Bleeding is similar to a light period currently.      Objective   Allergies:   Advil [ibuprofen], Sudafed [pseudoephedrine hcl], Vancomycin, and Penicillins         Last Vitals:  Temp Pulse Resp BP MAP Pulse Ox   36.9 °C (98.4 °F) 80 18 133/70   98 %     Vitals Min/Max Last 24 Hours:  Temp  Min: 36.3 °C (97.3 °F)  Max: 37.8 °C (100 °F)  Pulse  Min: 63  Max: 96  Resp  Min: 15  Max: 30  BP  Min: 123/62  Max:  183/87    Intake/Output:     Intake/Output Summary (Last 24 hours) at 2/13/2024 1808  Last data filed at 2/13/2024 1751  Gross per 24 hour   Intake 3053.57 ml   Output 3360 ml   Net -306.43 ml       Physical Exam:  Constitutional: No visible distress, alert and cooperative  Respiratory/Thorax: Normal respiratory effort on RA. Lungs CTAB.  Cardiovascular: RRR  Gastrointestinal: soft, nondistended, nontender  Neurological: 2+ DTR in bilat UE  Psychological: Appropriate mood and behavior    Lab Data:  Lab Results   Component Value Date    WBC 16.0 (H) 02/13/2024    HGB 11.8 (L) 02/13/2024    HCT 35.3 (L) 02/13/2024     02/13/2024     Lab Results   Component Value Date    GLUCOSE 111 (H) 02/13/2024     (L) 02/13/2024    K 4.3 02/13/2024    CL 99 02/13/2024    CO2 21 02/13/2024    ANIONGAP 16 02/13/2024    BUN 9 02/13/2024    CREATININE 0.69 02/13/2024    EGFR >90 02/13/2024    CALCIUM 8.7 02/13/2024    ALBUMIN 3.0 (L) 02/13/2024    PROT 6.0 (L) 02/13/2024    ALKPHOS 137 (H) 02/13/2024    ALT 70 (H) 02/13/2024    AST 52 (H) 02/13/2024    BILITOT 0.9 02/13/2024

## 2024-02-13 NOTE — SIGNIFICANT EVENT
SVE    Pt feeling some left sided pelvic pressure. Denies scotoma, RUQ pain, SOB, chest pain. HA stable at 2/10.    Cervical Exam  Dilation: 7  Effacement (%): 90  Fetal Station: -1  Method: Manual  OB Examiner: Clifford BARRETO  Fetal Assessment  Movement: Present  Mode: Fetal scalp electrode  Baseline Fetal Heart Rate (bpm): 135 bpm  Baseline Classification: Normal  Variability: Moderate (Between 6 and 25 BPM)  Pattern: Accelerations  FHR Category: Category I  Multiple Births: No      Contraction Frequency: 1-3    A/P:  - Now in active labor  - Cat I tracing  - Cont pit per protocol  - No s/s of Mg toxicity, last Mg level therapeutic (4.83)    D/w Dr. Soto Morgan MD PGY-2

## 2024-02-14 LAB
ALBUMIN SERPL BCP-MCNC: 3 G/DL (ref 3.4–5)
ALP SERPL-CCNC: 115 U/L (ref 33–110)
ALT SERPL W P-5'-P-CCNC: 55 U/L (ref 7–45)
ANION GAP SERPL CALC-SCNC: 14 MMOL/L (ref 10–20)
AST SERPL W P-5'-P-CCNC: 33 U/L (ref 9–39)
BILIRUB SERPL-MCNC: 0.6 MG/DL (ref 0–1.2)
BUN SERPL-MCNC: 8 MG/DL (ref 6–23)
CALCIUM SERPL-MCNC: 8.7 MG/DL (ref 8.6–10.6)
CHLORIDE SERPL-SCNC: 101 MMOL/L (ref 98–107)
CO2 SERPL-SCNC: 24 MMOL/L (ref 21–32)
CREAT SERPL-MCNC: 0.66 MG/DL (ref 0.5–1.05)
EGFRCR SERPLBLD CKD-EPI 2021: >90 ML/MIN/1.73M*2
ERYTHROCYTE [DISTWIDTH] IN BLOOD BY AUTOMATED COUNT: 14.3 % (ref 11.5–14.5)
GLUCOSE SERPL-MCNC: 87 MG/DL (ref 74–99)
HCT VFR BLD AUTO: 31.3 % (ref 36–46)
HGB BLD-MCNC: 10.5 G/DL (ref 12–16)
MCH RBC QN AUTO: 29.9 PG (ref 26–34)
MCHC RBC AUTO-ENTMCNC: 33.5 G/DL (ref 32–36)
MCV RBC AUTO: 89 FL (ref 80–100)
NRBC BLD-RTO: 0 /100 WBCS (ref 0–0)
PLATELET # BLD AUTO: 174 X10*3/UL (ref 150–450)
POTASSIUM SERPL-SCNC: 4.2 MMOL/L (ref 3.5–5.3)
PROT SERPL-MCNC: 5.5 G/DL (ref 6.4–8.2)
RBC # BLD AUTO: 3.51 X10*6/UL (ref 4–5.2)
SODIUM SERPL-SCNC: 135 MMOL/L (ref 136–145)
WBC # BLD AUTO: 11.9 X10*3/UL (ref 4.4–11.3)

## 2024-02-14 PROCEDURE — 99232 SBSQ HOSP IP/OBS MODERATE 35: CPT | Performed by: STUDENT IN AN ORGANIZED HEALTH CARE EDUCATION/TRAINING PROGRAM

## 2024-02-14 PROCEDURE — 85027 COMPLETE CBC AUTOMATED: CPT

## 2024-02-14 PROCEDURE — 36415 COLL VENOUS BLD VENIPUNCTURE: CPT

## 2024-02-14 PROCEDURE — 1210000001 HC SEMI-PRIVATE ROOM DAILY

## 2024-02-14 PROCEDURE — 99199 UNLISTED SPECIAL SVC PX/RPRT: CPT

## 2024-02-14 PROCEDURE — 84075 ASSAY ALKALINE PHOSPHATASE: CPT

## 2024-02-14 PROCEDURE — 2500000001 HC RX 250 WO HCPCS SELF ADMINISTERED DRUGS (ALT 637 FOR MEDICARE OP): Performed by: STUDENT IN AN ORGANIZED HEALTH CARE EDUCATION/TRAINING PROGRAM

## 2024-02-14 PROCEDURE — 2500000002 HC RX 250 W HCPCS SELF ADMINISTERED DRUGS (ALT 637 FOR MEDICARE OP, ALT 636 FOR OP/ED)

## 2024-02-14 PROCEDURE — 2500000001 HC RX 250 WO HCPCS SELF ADMINISTERED DRUGS (ALT 637 FOR MEDICARE OP)

## 2024-02-14 RX ORDER — POLYETHYLENE GLYCOL 3350 17 G/17G
17 POWDER, FOR SOLUTION ORAL 2 TIMES DAILY PRN
Status: DISCONTINUED | OUTPATIENT
Start: 2024-02-14 | End: 2024-02-16 | Stop reason: HOSPADM

## 2024-02-14 RX ORDER — ONDANSETRON HYDROCHLORIDE 2 MG/ML
4 INJECTION, SOLUTION INTRAVENOUS EVERY 6 HOURS PRN
Status: DISCONTINUED | OUTPATIENT
Start: 2024-02-14 | End: 2024-02-16 | Stop reason: HOSPADM

## 2024-02-14 RX ORDER — ONDANSETRON 4 MG/1
4 TABLET, FILM COATED ORAL EVERY 6 HOURS PRN
Status: DISCONTINUED | OUTPATIENT
Start: 2024-02-14 | End: 2024-02-16 | Stop reason: HOSPADM

## 2024-02-14 RX ORDER — NIFEDIPINE 10 MG/1
10 CAPSULE ORAL ONCE AS NEEDED
Status: DISCONTINUED | OUTPATIENT
Start: 2024-02-14 | End: 2024-02-16 | Stop reason: HOSPADM

## 2024-02-14 RX ORDER — NIFEDIPINE 90 MG/1
90 TABLET, EXTENDED RELEASE ORAL EVERY 24 HOURS
Status: DISCONTINUED | OUTPATIENT
Start: 2024-02-15 | End: 2024-02-15

## 2024-02-14 RX ORDER — TRANEXAMIC ACID 100 MG/ML
1000 INJECTION, SOLUTION INTRAVENOUS ONCE AS NEEDED
Status: DISCONTINUED | OUTPATIENT
Start: 2024-02-14 | End: 2024-02-16 | Stop reason: HOSPADM

## 2024-02-14 RX ORDER — LIDOCAINE 560 MG/1
1 PATCH PERCUTANEOUS; TOPICAL; TRANSDERMAL
Status: DISCONTINUED | OUTPATIENT
Start: 2024-02-14 | End: 2024-02-16 | Stop reason: HOSPADM

## 2024-02-14 RX ORDER — DIPHENHYDRAMINE HYDROCHLORIDE 50 MG/ML
25 INJECTION INTRAMUSCULAR; INTRAVENOUS EVERY 6 HOURS PRN
Status: DISCONTINUED | OUTPATIENT
Start: 2024-02-14 | End: 2024-02-15

## 2024-02-14 RX ORDER — BISACODYL 10 MG/1
10 SUPPOSITORY RECTAL DAILY PRN
Status: DISCONTINUED | OUTPATIENT
Start: 2024-02-14 | End: 2024-02-16 | Stop reason: HOSPADM

## 2024-02-14 RX ORDER — METHYLERGONOVINE MALEATE 0.2 MG/ML
0.2 INJECTION INTRAVENOUS ONCE AS NEEDED
Status: DISCONTINUED | OUTPATIENT
Start: 2024-02-14 | End: 2024-02-16 | Stop reason: HOSPADM

## 2024-02-14 RX ORDER — LOPERAMIDE HYDROCHLORIDE 2 MG/1
4 CAPSULE ORAL EVERY 2 HOUR PRN
Status: DISCONTINUED | OUTPATIENT
Start: 2024-02-14 | End: 2024-02-16 | Stop reason: HOSPADM

## 2024-02-14 RX ORDER — OXYTOCIN/0.9 % SODIUM CHLORIDE 30/500 ML
60 PLASTIC BAG, INJECTION (ML) INTRAVENOUS ONCE AS NEEDED
Status: DISCONTINUED | OUTPATIENT
Start: 2024-02-14 | End: 2024-02-15

## 2024-02-14 RX ORDER — LABETALOL HYDROCHLORIDE 5 MG/ML
20 INJECTION, SOLUTION INTRAVENOUS ONCE AS NEEDED
Status: DISCONTINUED | OUTPATIENT
Start: 2024-02-14 | End: 2024-02-16 | Stop reason: HOSPADM

## 2024-02-14 RX ORDER — OXYTOCIN 10 [USP'U]/ML
10 INJECTION, SOLUTION INTRAMUSCULAR; INTRAVENOUS ONCE AS NEEDED
Status: DISCONTINUED | OUTPATIENT
Start: 2024-02-14 | End: 2024-02-16 | Stop reason: HOSPADM

## 2024-02-14 RX ORDER — MISOPROSTOL 200 UG/1
800 TABLET ORAL ONCE AS NEEDED
Status: DISCONTINUED | OUTPATIENT
Start: 2024-02-14 | End: 2024-02-16 | Stop reason: HOSPADM

## 2024-02-14 RX ORDER — ENOXAPARIN SODIUM 100 MG/ML
60 INJECTION SUBCUTANEOUS EVERY 24 HOURS
Status: DISCONTINUED | OUTPATIENT
Start: 2024-02-15 | End: 2024-02-16 | Stop reason: HOSPADM

## 2024-02-14 RX ORDER — SIMETHICONE 80 MG
80 TABLET,CHEWABLE ORAL 4 TIMES DAILY PRN
Status: DISCONTINUED | OUTPATIENT
Start: 2024-02-14 | End: 2024-02-16 | Stop reason: HOSPADM

## 2024-02-14 RX ORDER — HYDRALAZINE HYDROCHLORIDE 20 MG/ML
5 INJECTION INTRAMUSCULAR; INTRAVENOUS ONCE AS NEEDED
Status: COMPLETED | OUTPATIENT
Start: 2024-02-14 | End: 2024-02-15

## 2024-02-14 RX ORDER — CARBOPROST TROMETHAMINE 250 UG/ML
250 INJECTION, SOLUTION INTRAMUSCULAR ONCE AS NEEDED
Status: DISCONTINUED | OUTPATIENT
Start: 2024-02-14 | End: 2024-02-16 | Stop reason: HOSPADM

## 2024-02-14 RX ORDER — DIPHENHYDRAMINE HCL 25 MG
25 CAPSULE ORAL EVERY 6 HOURS PRN
Status: DISCONTINUED | OUTPATIENT
Start: 2024-02-14 | End: 2024-02-15

## 2024-02-14 RX ORDER — ACETAMINOPHEN 325 MG/1
975 TABLET ORAL EVERY 6 HOURS
Status: DISCONTINUED | OUTPATIENT
Start: 2024-02-14 | End: 2024-02-16 | Stop reason: HOSPADM

## 2024-02-14 RX ORDER — OXYTOCIN 10 [USP'U]/ML
10 INJECTION, SOLUTION INTRAMUSCULAR; INTRAVENOUS ONCE AS NEEDED
Status: DISCONTINUED | OUTPATIENT
Start: 2024-02-14 | End: 2024-02-15

## 2024-02-14 RX ORDER — ADHESIVE BANDAGE
10 BANDAGE TOPICAL
Status: DISCONTINUED | OUTPATIENT
Start: 2024-02-14 | End: 2024-02-16 | Stop reason: HOSPADM

## 2024-02-14 RX ADMIN — BENZOCAINE AND LEVOMENTHOL 1 APPLICATION: 200; 5 SPRAY TOPICAL at 11:12

## 2024-02-14 RX ADMIN — ACETAMINOPHEN 975 MG: 325 TABLET ORAL at 23:11

## 2024-02-14 RX ADMIN — ACETAMINOPHEN 975 MG: 325 TABLET ORAL at 16:59

## 2024-02-14 RX ADMIN — ACETAMINOPHEN 975 MG: 325 TABLET ORAL at 11:13

## 2024-02-14 RX ADMIN — NIFEDIPINE 30 MG: 30 TABLET, FILM COATED, EXTENDED RELEASE ORAL at 04:34

## 2024-02-14 RX ADMIN — LABETALOL HYDROCHLORIDE 800 MG: 200 TABLET, FILM COATED ORAL at 02:27

## 2024-02-14 RX ADMIN — LABETALOL HYDROCHLORIDE 800 MG: 200 TABLET, FILM COATED ORAL at 11:13

## 2024-02-14 RX ADMIN — LABETALOL HYDROCHLORIDE 800 MG: 200 TABLET, FILM COATED ORAL at 18:55

## 2024-02-14 ASSESSMENT — PAIN SCALES - GENERAL
PAINLEVEL_OUTOF10: 0 - NO PAIN
PAINLEVEL_OUTOF10: 2
PAINLEVEL_OUTOF10: 0 - NO PAIN
PAINLEVEL_OUTOF10: 1
PAINLEVEL_OUTOF10: 0 - NO PAIN
PAINLEVEL_OUTOF10: 2
PAINLEVEL_OUTOF10: 0 - NO PAIN

## 2024-02-14 ASSESSMENT — PAIN - FUNCTIONAL ASSESSMENT
PAIN_FUNCTIONAL_ASSESSMENT: 0-10

## 2024-02-14 ASSESSMENT — PAIN DESCRIPTION - DESCRIPTORS: DESCRIPTORS: BURNING;SORE

## 2024-02-14 NOTE — CARE PLAN
Problem: Hypertensive Disorder of Pregnancy (HDP)  Goal: Minimal s/sx of HDP and BP<160/110  Outcome: Progressing     Problem: Postpartum  Goal: Experiences normal postpartum course  Outcome: Progressing     Problem: Postpartum  Goal: No s/sx infection  Outcome: Progressing     Problem: Postpartum  Goal: No s/sx of hemorrhage  Outcome: Progressing     Problem: Postpartum  Goal: Minimal s/sx of HDP and BP<160/110  Outcome: Progressing   The patient's goals for the shift include visit baby in NICU.    The clinical goals for the shift include BP <160/110    Over the shift, the patient continued to make progress toward the following goals. Patient has scant VB, fundus is midline/firm, and no complaint of any HDP s/sx. BP has remain WDL. Patient is actively breast pumping and pain is well controlled.

## 2024-02-14 NOTE — DISCHARGE INSTRUCTIONS
Any woman can have complications after a birth including a blood clot, a heart problem, hypertensive disorder/eclampsia, depression, hemorrhage, or infection. Notify all providers of your delivery date up to one year after birth.*       Call 911 or go to nearest emergency room right away if you have:   PAIN or pressure in chest;   OBSTRUCTED breathing or shortness of breath;   SEIZURES;   THOUGHTS of hurting yourself or someone else; heart palpitations/racing; change in alertness/confusion.    Call your provider if you have:   BLEEDING, soaking through a pad/hour, or blood clots the size of an egg or bigger;   INCISION (episiotomy stitches or  site) that is not healing (increased redness, pain, drainage/pus, or separation) if you had one;   RED or swollen leg/calf that is painful or warm to touch, especially in one leg more than the other;   TEMPERATURE of 100.4 F or higher or chills;   HEADACHE that does not get better with medicine, rest or hydration, or bad headache with vision changes   like spots or flashing lights; increased swelling of face, hands or legs; severe cramps or upper right belly pain; red or swollen breast that is painful or warm to touch; an unusual, foul odor from your vaginal discharge; pain, burning, or difficulty during urination; severe constipation (more than 5 days); feelings of depression (such as depressed mood, loss of interest in enjoyable things, unable to care for yourself, trouble sleeping, lack of appetite, or feeling worthless).     If you can't reach your provider or symptoms worsen, call 911 or go to nearest emergency room.   *Information obtained from [facility]: Save Your Life: Get Care for These POST-BIRTH Warning Signs

## 2024-02-14 NOTE — ANESTHESIA POSTPROCEDURE EVALUATION
Patient: Nory Rao    Procedure Summary       Date: 02/12/24 Room / Location:     Anesthesia Start: 0439 Anesthesia Stop: 02/13/24 0905    Procedure: Labor Analgesia Diagnosis:     Scheduled Providers:  Responsible Provider: Dorene Bowden MD    Anesthesia Type: epidural ASA Status: 3            Anesthesia Type: epidural    Vitals:    02/14/24 1000   BP: 111/72   Pulse: 77   Resp: 18   Temp: 36.4 °C (97.5 °F)   SpO2: 97%       Anesthesia Post Evaluation    Patient location during evaluation: bedside  Patient participation: complete - patient participated  Level of consciousness: awake and alert  Pain management: adequate  Airway patency: patent  Cardiovascular status: acceptable  Respiratory status: acceptable  Hydration status: acceptable  Postoperative Nausea and Vomiting: none        No notable events documented.

## 2024-02-14 NOTE — PROGRESS NOTES
"Postpartum Note    Assessment/Plan    Nory Rao is a 34 y.o. year old  now PPD 1 s/p  c/b siPEC w/ SF and uterine atony.     s/p  on    - c/b uterine atony: hemabate, cytotec, and TXA given, Laura placed -> removed at approx 1230  - Total  (300 from delivery + 50 in Laura tubing)  - Hb before delivery 11.9 -> 11.8 at time of Laura removal  - continue routine postpartum care  - pain well controlled on po medications    siPEC w/SF  - Diagnosed by severe range BPs requiring IV treatment   - IV tx:  2/10 Labetalol 20    Hydral 5/5/10  - Current antihypertensive regimen: labetalol 800mg TID + Nifed 90 ()  - HELLP labs notable for:           ALT/AST elevated, peaked at 84/64 -> now down to 70/52 > 33/55 ()           plt 144 -> 147 -> 143 -> 162 -> 148 -> 158 > 174 ()  - Unrelenting headache prior to delivery, now resolved  - s/p 24 hours of magnesium   - 1 L O2 requirement while sleeping, weaned off O2 while awake. Lungs clear. Low c/f pulm edema, will obtain CXR if worsening O2 requirement or new clinical signs    Heme  - Admit Hgb 11.3 >  > PPD 1 Hgb 10.5    Routine post-op care  - Pain well controlled, bleeding mild  - PPFP: Declines    Dispo  - appropriate for d/c on PPD #3 if remains stable  - for f/u 1month with Primary OB provider    Marilia Valera MD  OBGYN Attending Physician    Subjective    Patient feels well this morning. She is anxious to get to NICU to see baby. Denies HA, vision changes, RUQ pain. Denies chest pain/SOB.     Objective    Visit Vitals  /79   Pulse 81   Temp 36.2 °C (97.2 °F) (Temporal)   Resp 18   Ht 1.646 m (5' 4.8\")   Wt 134 kg (294 lb 8.6 oz)   LMP 2023   SpO2 98%   Breastfeeding Unknown   BMI 49.32 kg/m²   OB Status Recent pregnancy   Smoking Status Never   BSA 2.48 m²        Physical Examination  Lungs: clear to auscultation bilaterally, no wheezes, rhonchi, crackles   Heart: regular rate and rhythm, normal S1 and S2 "   Abd: soft. fundus firm below umbilicus   Ext: no edema or pain  Neuro: DTR 2+ bilaterally in UE    Lab Review  Results from last 7 days   Lab Units 02/14/24  0507 02/13/24  1238   WBC AUTO x10*3/uL 11.9* 16.0*   HEMOGLOBIN g/dL 10.5* 11.8*   HEMATOCRIT % 31.3* 35.3*   PLATELETS AUTO x10*3/uL 174 179   AST U/L 33 52*   ALT U/L 55* 70*

## 2024-02-15 PROCEDURE — 2500000001 HC RX 250 WO HCPCS SELF ADMINISTERED DRUGS (ALT 637 FOR MEDICARE OP): Performed by: STUDENT IN AN ORGANIZED HEALTH CARE EDUCATION/TRAINING PROGRAM

## 2024-02-15 PROCEDURE — 2500000004 HC RX 250 GENERAL PHARMACY W/ HCPCS (ALT 636 FOR OP/ED)

## 2024-02-15 PROCEDURE — 2500000002 HC RX 250 W HCPCS SELF ADMINISTERED DRUGS (ALT 637 FOR MEDICARE OP, ALT 636 FOR OP/ED): Performed by: NURSE PRACTITIONER

## 2024-02-15 PROCEDURE — 2500000001 HC RX 250 WO HCPCS SELF ADMINISTERED DRUGS (ALT 637 FOR MEDICARE OP): Performed by: NURSE PRACTITIONER

## 2024-02-15 PROCEDURE — 2500000002 HC RX 250 W HCPCS SELF ADMINISTERED DRUGS (ALT 637 FOR MEDICARE OP, ALT 636 FOR OP/ED): Performed by: STUDENT IN AN ORGANIZED HEALTH CARE EDUCATION/TRAINING PROGRAM

## 2024-02-15 PROCEDURE — 1210000001 HC SEMI-PRIVATE ROOM DAILY

## 2024-02-15 PROCEDURE — 2500000004 HC RX 250 GENERAL PHARMACY W/ HCPCS (ALT 636 FOR OP/ED): Performed by: STUDENT IN AN ORGANIZED HEALTH CARE EDUCATION/TRAINING PROGRAM

## 2024-02-15 PROCEDURE — 99231 SBSQ HOSP IP/OBS SF/LOW 25: CPT | Performed by: NURSE PRACTITIONER

## 2024-02-15 RX ORDER — HYDRALAZINE HYDROCHLORIDE 20 MG/ML
10 INJECTION INTRAMUSCULAR; INTRAVENOUS ONCE
Status: COMPLETED | OUTPATIENT
Start: 2024-02-15 | End: 2024-02-15

## 2024-02-15 RX ORDER — DIPHENHYDRAMINE HCL 25 MG
50 CAPSULE ORAL EVERY 6 HOURS PRN
Status: DISCONTINUED | OUTPATIENT
Start: 2024-02-15 | End: 2024-02-16 | Stop reason: HOSPADM

## 2024-02-15 RX ORDER — HYDROCHLOROTHIAZIDE 25 MG/1
12.5 TABLET ORAL DAILY
Status: DISCONTINUED | OUTPATIENT
Start: 2024-02-15 | End: 2024-02-15

## 2024-02-15 RX ORDER — HYDRALAZINE HYDROCHLORIDE 20 MG/ML
5 INJECTION INTRAMUSCULAR; INTRAVENOUS ONCE AS NEEDED
Status: DISCONTINUED | OUTPATIENT
Start: 2024-02-15 | End: 2024-02-16 | Stop reason: HOSPADM

## 2024-02-15 RX ORDER — HYDRALAZINE HYDROCHLORIDE 20 MG/ML
5 INJECTION INTRAMUSCULAR; INTRAVENOUS ONCE
Status: COMPLETED | OUTPATIENT
Start: 2024-02-15 | End: 2024-02-15

## 2024-02-15 RX ORDER — NIFEDIPINE 30 MG/1
30 TABLET, FILM COATED, EXTENDED RELEASE ORAL ONCE
Status: COMPLETED | OUTPATIENT
Start: 2024-02-15 | End: 2024-02-15

## 2024-02-15 RX ORDER — NIFEDIPINE 60 MG/1
120 TABLET, FILM COATED, EXTENDED RELEASE ORAL EVERY 24 HOURS
Status: DISCONTINUED | OUTPATIENT
Start: 2024-02-16 | End: 2024-02-16 | Stop reason: HOSPADM

## 2024-02-15 RX ORDER — HYDRALAZINE HYDROCHLORIDE 20 MG/ML
INJECTION INTRAMUSCULAR; INTRAVENOUS
Status: COMPLETED
Start: 2024-02-15 | End: 2024-02-15

## 2024-02-15 RX ADMIN — ACETAMINOPHEN 975 MG: 325 TABLET ORAL at 16:58

## 2024-02-15 RX ADMIN — NIFEDIPINE 90 MG: 90 TABLET, FILM COATED, EXTENDED RELEASE ORAL at 05:09

## 2024-02-15 RX ADMIN — ACETAMINOPHEN 975 MG: 325 TABLET ORAL at 23:30

## 2024-02-15 RX ADMIN — NIFEDIPINE 30 MG: 30 TABLET, FILM COATED, EXTENDED RELEASE ORAL at 08:49

## 2024-02-15 RX ADMIN — HYDRALAZINE HYDROCHLORIDE 5 MG: 20 INJECTION INTRAMUSCULAR; INTRAVENOUS at 08:49

## 2024-02-15 RX ADMIN — ENOXAPARIN SODIUM 60 MG: 100 INJECTION SUBCUTANEOUS at 10:49

## 2024-02-15 RX ADMIN — LABETALOL HYDROCHLORIDE 800 MG: 200 TABLET, FILM COATED ORAL at 10:47

## 2024-02-15 RX ADMIN — ACETAMINOPHEN 975 MG: 325 TABLET ORAL at 10:47

## 2024-02-15 RX ADMIN — HYDRALAZINE HYDROCHLORIDE 5 MG: 20 INJECTION INTRAMUSCULAR; INTRAVENOUS at 10:59

## 2024-02-15 RX ADMIN — LABETALOL HYDROCHLORIDE 800 MG: 200 TABLET, FILM COATED ORAL at 18:51

## 2024-02-15 RX ADMIN — LABETALOL HYDROCHLORIDE 800 MG: 200 TABLET, FILM COATED ORAL at 03:22

## 2024-02-15 RX ADMIN — DIPHENHYDRAMINE HYDROCHLORIDE 50 MG: 25 CAPSULE ORAL at 08:51

## 2024-02-15 RX ADMIN — ACETAMINOPHEN 975 MG: 325 TABLET ORAL at 05:09

## 2024-02-15 RX ADMIN — HYDRALAZINE HYDROCHLORIDE 10 MG: 20 INJECTION INTRAMUSCULAR; INTRAVENOUS at 12:58

## 2024-02-15 ASSESSMENT — PAIN SCALES - GENERAL
PAINLEVEL_OUTOF10: 0 - NO PAIN
PAINLEVEL_OUTOF10: 0 - NO PAIN
PAINLEVEL_OUTOF10: 1
PAINLEVEL_OUTOF10: 0 - NO PAIN

## 2024-02-15 ASSESSMENT — PAIN DESCRIPTION - DESCRIPTORS: DESCRIPTORS: CRAMPING;SORE

## 2024-02-15 ASSESSMENT — PAIN - FUNCTIONAL ASSESSMENT: PAIN_FUNCTIONAL_ASSESSMENT: 0-10

## 2024-02-15 NOTE — PROGRESS NOTES
RODERICK attempted to meet with mother but informed she is on L&D for blood pressure issues. RODERICK will attempt at a later time.     SILVA Narayanan  Ext [phone] [facility]

## 2024-02-15 NOTE — INDIVIDUALIZED OVERALL PLAN OF CARE NOTE
At bedside given transfer to L&D for postpartum SRBPs. Denies HA, visual changes, chest pain, shortness of breath or RUQ pain. Now maxed out on labetalol 800mg TID and nifedipine 60mg BID. HELLP labs improving, AM labs ordered. For transfer to postpartum if 4 hrs from treatment. Patient expresses understanding.   Ingrid May MD

## 2024-02-15 NOTE — NURSING NOTE
Patient to be transferred to mac 2 for BP monitoring in setting of severe range blood pressures.  RN treated BP accordingly with 5mg of hydral and 30mg of nifed per NP somich. BP treated at 0852 and immediately transferred.

## 2024-02-15 NOTE — CARE PLAN
The patient's goals for the shift include pain management <4/10, VSS, visit baby in NICU.    The clinical goals for the shift include meeting post-op milestones.

## 2024-02-15 NOTE — LACTATION NOTE
This note was copied from a baby's chart.  Lactation Consultant Note  Lactation Consultation   Meg Burr,RN IBCLC  Recommendations/Summary       I spoke with FOB at pt's bedside.  He reports that mom was able to visit last evening and held baby for several hours.  She had to return to L&D for blood pressure concerns.  She is pumping but unable to be on a good schedule due to her blood pressure treatment.  Invited to contact LC services as needed.

## 2024-02-15 NOTE — PROGRESS NOTES
Postpartum Progress Note      Assessment/Plan     Nory Rao is a 34 y.o.,  initially presented for  HDP  She had a Vaginal, Spontaneous   delivery on 2024  at 34w0d and is now postpartum day 2.    siPEC w/SF  - Diagnosed by severe range BPs requiring IV treatment   - IV tx:  2/10 Labetalol 20    Hydral 5/5/10  2/15 Hydral 5 now, Transfer to Mac 2 for BP management  - Current antihypertensive regimen: labetalol 800mg TID + Nifed 90 () -> 30mg x1 now to = new 120mg dose  - HELLP labs notable for:           ALT/AST elevated, peaked at 84/64 -> now down to 70/52 > 33/55 ()           plt 144 -> 147 -> 143 -> 162 -> 148 -> 158 > 174 ()  - Unrelenting headache prior to delivery, now resolved  - s/p 24 hours of magnesium   - 1 L O2 requirement while sleeping, weaned off O2 while awake. Lungs clear. Low c/f pulm edema, will obtain CXR if worsening O2 requirement or new clinical signs    #Postpartum  - continue routine postpartum care  - pain well controlled on po medications  - DVT Score: 5 ; SCDs and enoxaparin prophylactic dose daily while inpatient  - The patient's blood type is O POS. The baby's blood type is A POS . Rhogam is not indicated.    #Maternal Well-Being  - emotional support provided  - benadryl prn for sleep  - Contraception: Defers contraception to primary OB/PP visit. We discussed pregnancy spacing of at least one year, abstaining from intercourse for 6wks, and the ability to become pregnant in the absence of regular menses. Pt verbalized understanding.     #[place] Feeding  - breastfeeding/pumping encouraged; lactation consult prn    #Dispo  - Transfer to Mac 2 for BP management  - The signs and symptoms of PEC were reviewed with the patient, including unrelenting headache, vision changes/blurred vision, and RUQ pain  - BP cuff for home for checking BP twice a day  - Pt instructed to call primary OB if SBP > 160 or DBP > 110 or if development of PEC symptoms   - On  "discharge, follow up with primary OB in:       - 2-5 days for BP check       - 4-6 week for post-partum visit       Principal Problem:    Severe pre-eclampsia complicating childbirth  Active Problems:    Elevated liver function tests    Subjective   Pt extremely tearful- hasn't slept more than 3 consecutive hours in 1wk. \"It's too much.\"  Denies SOB, chest pressure/pain, palpitations.     Meeting all postpartum milestones- ambulating independently, passing flatus, tolerating PO intake, lochia light, voiding spontaneously, and pain well controlled with PO meds.    Objective   Physical Exam:  General: well appearing, obese, postpartum  Obstetric: fundus firm below umbilicus, lochia light  Skin: Warm, dry; no rashes/lesions/erythema  Breast: No masses, nipple discharge  Neuro: A/Ox3, no gross motor deficit   GI: no distension, appropriately tender, soft, +BS  Respiratory: Even and unlabored on RA, LSCTA BL  Cardiovascular: 2+ BLE edema; No erythema, warmth  Psych: appropriate mood and affect, conversational    Last Vitals:  Temp Pulse Resp BP MAP Pulse Ox   37.1 °C (98.8 °F) 65 16 (!) 170/109 (Laureen RN and Chaya NP aware)   (!) 95 %       Vitals Min/Max Last 24 Hours:  Temp  Min: 36.1 °C (97 °F)  Max: 37.1 °C (98.8 °F)  Pulse  Min: 64  Max: 86  Resp  Min: 16  Max: 20  BP  Min: 111/72  Max: 170/109    Lab Data:  Lab Results   Component Value Date    WBC 11.9 (H) 02/14/2024    HGB 10.5 (L) 02/14/2024    HCT 31.3 (L) 02/14/2024     02/14/2024      "

## 2024-02-16 ENCOUNTER — PHARMACY VISIT (OUTPATIENT)
Dept: PHARMACY | Facility: CLINIC | Age: 34
End: 2024-02-16
Payer: COMMERCIAL

## 2024-02-16 VITALS
RESPIRATION RATE: 18 BRPM | OXYGEN SATURATION: 96 % | TEMPERATURE: 97.7 F | DIASTOLIC BLOOD PRESSURE: 85 MMHG | BODY MASS INDEX: 48.82 KG/M2 | WEIGHT: 293 LBS | HEIGHT: 65 IN | SYSTOLIC BLOOD PRESSURE: 129 MMHG | HEART RATE: 72 BPM

## 2024-02-16 LAB
ALBUMIN SERPL BCP-MCNC: 3.5 G/DL (ref 3.4–5)
ALP SERPL-CCNC: 118 U/L (ref 33–110)
ALT SERPL W P-5'-P-CCNC: 43 U/L (ref 7–45)
ANION GAP SERPL CALC-SCNC: 18 MMOL/L (ref 10–20)
AST SERPL W P-5'-P-CCNC: 24 U/L (ref 9–39)
BILIRUB SERPL-MCNC: 0.4 MG/DL (ref 0–1.2)
BUN SERPL-MCNC: 12 MG/DL (ref 6–23)
CALCIUM SERPL-MCNC: 9.7 MG/DL (ref 8.6–10.6)
CHLORIDE SERPL-SCNC: 102 MMOL/L (ref 98–107)
CO2 SERPL-SCNC: 22 MMOL/L (ref 21–32)
CREAT SERPL-MCNC: 0.78 MG/DL (ref 0.5–1.05)
EGFRCR SERPLBLD CKD-EPI 2021: >90 ML/MIN/1.73M*2
ERYTHROCYTE [DISTWIDTH] IN BLOOD BY AUTOMATED COUNT: 14.1 % (ref 11.5–14.5)
GLUCOSE SERPL-MCNC: 99 MG/DL (ref 74–99)
HCT VFR BLD AUTO: 36.3 % (ref 36–46)
HGB BLD-MCNC: 11.5 G/DL (ref 12–16)
MCH RBC QN AUTO: 28.9 PG (ref 26–34)
MCHC RBC AUTO-ENTMCNC: 31.7 G/DL (ref 32–36)
MCV RBC AUTO: 91 FL (ref 80–100)
NRBC BLD-RTO: 0 /100 WBCS (ref 0–0)
PLATELET # BLD AUTO: 231 X10*3/UL (ref 150–450)
POTASSIUM SERPL-SCNC: 4.4 MMOL/L (ref 3.5–5.3)
PROT SERPL-MCNC: 6.7 G/DL (ref 6.4–8.2)
RBC # BLD AUTO: 3.98 X10*6/UL (ref 4–5.2)
SODIUM SERPL-SCNC: 138 MMOL/L (ref 136–145)
WBC # BLD AUTO: 10.6 X10*3/UL (ref 4.4–11.3)

## 2024-02-16 PROCEDURE — 2500000004 HC RX 250 GENERAL PHARMACY W/ HCPCS (ALT 636 FOR OP/ED): Performed by: STUDENT IN AN ORGANIZED HEALTH CARE EDUCATION/TRAINING PROGRAM

## 2024-02-16 PROCEDURE — 80053 COMPREHEN METABOLIC PANEL: CPT | Performed by: STUDENT IN AN ORGANIZED HEALTH CARE EDUCATION/TRAINING PROGRAM

## 2024-02-16 PROCEDURE — 2500000002 HC RX 250 W HCPCS SELF ADMINISTERED DRUGS (ALT 637 FOR MEDICARE OP, ALT 636 FOR OP/ED): Performed by: STUDENT IN AN ORGANIZED HEALTH CARE EDUCATION/TRAINING PROGRAM

## 2024-02-16 PROCEDURE — 2500000001 HC RX 250 WO HCPCS SELF ADMINISTERED DRUGS (ALT 637 FOR MEDICARE OP): Performed by: STUDENT IN AN ORGANIZED HEALTH CARE EDUCATION/TRAINING PROGRAM

## 2024-02-16 PROCEDURE — 85027 COMPLETE CBC AUTOMATED: CPT | Performed by: STUDENT IN AN ORGANIZED HEALTH CARE EDUCATION/TRAINING PROGRAM

## 2024-02-16 PROCEDURE — 36415 COLL VENOUS BLD VENIPUNCTURE: CPT | Performed by: STUDENT IN AN ORGANIZED HEALTH CARE EDUCATION/TRAINING PROGRAM

## 2024-02-16 PROCEDURE — RXMED WILLOW AMBULATORY MEDICATION CHARGE

## 2024-02-16 RX ORDER — ACETAMINOPHEN 325 MG/1
975 TABLET ORAL EVERY 6 HOURS
Qty: 90 TABLET | Refills: 0 | Status: SHIPPED | OUTPATIENT
Start: 2024-02-16 | End: 2024-02-16 | Stop reason: SDUPTHER

## 2024-02-16 RX ORDER — NIFEDIPINE 60 MG/1
120 TABLET, FILM COATED, EXTENDED RELEASE ORAL EVERY 24 HOURS
Qty: 60 TABLET | Refills: 3 | Status: SHIPPED | OUTPATIENT
Start: 2024-02-17

## 2024-02-16 RX ORDER — LABETALOL 200 MG/1
800 TABLET, FILM COATED ORAL EVERY 8 HOURS
Qty: 360 TABLET | Refills: 2 | Status: SHIPPED | OUTPATIENT
Start: 2024-02-16

## 2024-02-16 RX ORDER — NIFEDIPINE 60 MG/1
120 TABLET, FILM COATED, EXTENDED RELEASE ORAL EVERY 24 HOURS
Qty: 60 TABLET | Refills: 3 | Status: SHIPPED | OUTPATIENT
Start: 2024-02-17 | End: 2024-02-16 | Stop reason: SDUPTHER

## 2024-02-16 RX ORDER — ACETAMINOPHEN 325 MG/1
975 TABLET ORAL EVERY 6 HOURS
Qty: 90 TABLET | Refills: 0 | Status: SHIPPED | OUTPATIENT
Start: 2024-02-16

## 2024-02-16 RX ORDER — LABETALOL 200 MG/1
800 TABLET, FILM COATED ORAL EVERY 8 HOURS
Qty: 360 TABLET | Refills: 2 | Status: SHIPPED | OUTPATIENT
Start: 2024-02-16 | End: 2024-02-16 | Stop reason: SDUPTHER

## 2024-02-16 RX ADMIN — NIFEDIPINE 120 MG: 60 TABLET, FILM COATED, EXTENDED RELEASE ORAL at 05:17

## 2024-02-16 RX ADMIN — ACETAMINOPHEN 975 MG: 325 TABLET ORAL at 17:11

## 2024-02-16 RX ADMIN — ENOXAPARIN SODIUM 60 MG: 100 INJECTION SUBCUTANEOUS at 10:07

## 2024-02-16 RX ADMIN — ACETAMINOPHEN 975 MG: 325 TABLET ORAL at 11:08

## 2024-02-16 RX ADMIN — LABETALOL HYDROCHLORIDE 800 MG: 200 TABLET, FILM COATED ORAL at 10:07

## 2024-02-16 RX ADMIN — LABETALOL HYDROCHLORIDE 800 MG: 200 TABLET, FILM COATED ORAL at 02:06

## 2024-02-16 RX ADMIN — ACETAMINOPHEN 975 MG: 325 TABLET ORAL at 05:18

## 2024-02-16 RX ADMIN — LABETALOL HYDROCHLORIDE 800 MG: 200 TABLET, FILM COATED ORAL at 18:01

## 2024-02-16 ASSESSMENT — PAIN SCALES - GENERAL
PAINLEVEL_OUTOF10: 0 - NO PAIN
PAINLEVEL_OUTOF10: 1
PAINLEVEL_OUTOF10: 0 - NO PAIN

## 2024-02-16 NOTE — DISCHARGE INSTR - ACTIVITY
Pelvic rest until cleared by your OB provider- nothing inserted into the vagina until cleared by your OB provider.

## 2024-02-16 NOTE — PROGRESS NOTES
Social Work Assessment       Parents: Nory Rao and José Antonio Chance  Address: [address] [place], [place] [address]   Phone: [phone]    Referral Reason: NICU Admisson.  34.0 wga    Prenatal Care: [facility]     Name: Airam   : 24    Other Children: Mother states this is her and spouse's first child    Household Composition: Mother and spouse reside together. Baby will join them once she is ready for discharge    IPV/DV or Safety Concerns: Denies    Car-Seat: Yes  Safe Sleep Space: Yes   Safe Sleep Education: SWRK discussed the ABC's of safe sleep.      Transportation Concerns: None identified     School/Work/Income: Mother is a  and father is an .     Insurance: O    Mental Health Diagnoses: Denies   Medication(s): Denies  Counseling: Denies    Supports: Mother states her mother is staying with them to help    Substance Use History: Denies    Toxicology Screens: None completed during pregnancy or at time of delivery    Department of Children and Family Services (DCFS): None      Assessment: SWRK met with on MAC4 to introduce role, offer support and discuss resources. Mother open and receptive to speaking with social work.    Mother (Nory) delivered a baby girl named Airam on 23 at 34.0 wga. Mother states she was induced for pre-eclampsia. Mother reports being admitted since 2/10/24 to the hospital. Mother reports receiving her PNC with [facility].    Mother states she has already added the baby to her [employer] insurance plan. SWRK did discuss [facility] and provided brochure and application. Mother aware if she chooses to complete she can submit back to social work.    SWRK discussed Post-Partum Depression. Mother aware of what it is. SWRK provided handout. Mother denies mental health concerns. SWRK made mother aware of MH services through the hospital or to reach out to her OB if needed.    Mother states she and spouse are fully prepared for baby and have all needed  supplies including a car seat and safe sleep. SWRK discussed the ABC's of safe sleep.     Mother states she and father are both employed and have access to time. Mother denies financial concerns    Mother identified  Zuleyma as baby's PMD.     Mother stated she and father have good support and that her mother is staying to help out for a period of time.     SWRK discussed visitor policy, Mom's Club and NICU resources.         Plan: There are no psychosocial concerns. SWRK will remain available to follow as needed.       SILVA Narayanan  Ext [phone] [facility]

## 2024-02-16 NOTE — LACTATION NOTE
This note was copied from a baby's chart.  Lactation Consultant Note  Lactation Consultation  Reason for Consult: Initial assessment, NICU baby    Maternal Information  Has mother  before?: No  Infant to breast within first 2 hours of birth?: No  Breastfeeding Delayed Due to: Infant status  Exclusive Pump and Bottle Feed: No    Maternal Assessment  Breast Assessment: Pendulous, Symmetrical  Nipple Assessment: Intact, Erect  [place] Assessment: Normal    Infant Assessment  Infant Behavior: Sleepy  Infant Assessment: Jaundice, Premature    Feeding Assessment  Nutrition Source: Breastmilk, Donor human milk  Feeding Method: Nursing at the breast, Paced bottle  Feeding Position: Cross - cradle, Football/seated  Suck/Feeding: Sustained, Nipple shield used  Latch Assessment: Latch achieved    LATCH TOOL  Latch: Repeated attempts, hold nipple in mouth, stimulate to suck  Audible Swallowing: A few with stimulation  Type of Nipple: Everted (After stimulation)  Comfort (Breast/Nipple): Soft/non-tender  Hold (Positioning): Full assist, staff holds infant at breast  LATCH Score: 6    Breast Pump  Pump: Hospital grade electric pump  Frequency: 5-7 times per day  Duration: Initiate phase  Breast Shield Size and Type: 24 mm  Volume of Milk Production: 23  Units of Volume: mL per session    Other OB Lactation Tools  Lactation Tools: Nipple shields    Patient Follow-up       Other OB Lactation Documentation  Maternal Risk Factors: Age over 30, primiparity, Obesity (pre-pregnancy BMI >30), Hypertension,  delivery <37 weeks,  delivery  Infant Risk Factors: Prematurity <37 weeks    Recommendations/Summary       I spoke with mom at pt's bedside to explained availability of the [facility] LC services.  Instructed on listed patient education: PAOLO, Benefits of mother's own milk for the infant, breast massage and hand expression,CDC pump cleaning & sanitizing guidelines. During this visit I assisted mom with getting infant  out for breastfeeding.  Mom has breast tissue which falls over her nipple so they are underneath the breast.  Baby was first brought to mom's left breast in a cross cradle hold and she could not get in proper position for a good latch.  A number 20 mm nipple shield was introduce and baby was able to latch with this tool but did not sustain a suckle. To wake her up, I had mom move her into a football hold on this same side.  In this position, the baby did latch without the nipple shield and di a few suckles.  She then fell asleep.  Since the baby is under phototherapy and could not hang out with mom, she was given a supplement of expressed MBM.  Mom was pleased with session ans was encouraged to keep practice latching as baby tolerated it. Invited to contact  services as needed.

## 2024-02-16 NOTE — DISCHARGE SUMMARY
"Discharge Summary    Admission Date: 2/10/2024  Discharge Date: 02/16/24      Discharge Diagnosis  Vaginal delivery    Hospital Course  Delivery Date: 2/13/2024  9:05 AM   Delivery type: Vaginal, Spontaneous    GA at delivery: 34w0d  Outcome: Living   Anesthesia during delivery: Epidural   Intrapartum complications: Uterine Atony   Feeding method: Breastfeeding Status: Yes     Procedures: none  Contraception at discharge: none  Feels well, \"much better today\".  Denies any sPEC sx.  Discussed bp meds/monitoring and early follow up    Discharge Meds     Your medication list        START taking these medications        Instructions Last Dose Given Next Dose Due   acetaminophen 325 mg tablet  Commonly known as: Tylenol      Take 3 tablets (975 mg) by mouth every 6 hours.       NIFEdipine ER 60 mg 24 hr tablet  Commonly known as: Adalat CC  Start taking on: February 17, 2024      Take 2 tablets (120 mg) by mouth once every 24 hours. Do not crush, chew, or split. Do not start before February 17, 2024.              CHANGE how you take these medications        Instructions Last Dose Given Next Dose Due   labetalol 200 mg tablet  Commonly known as: Normodyne  What changed:   how much to take  when to take this  additional instructions  Another medication with the same name was removed. Continue taking this medication, and follow the directions you see here.      Take 4 tablets (800 mg) by mouth every 8 hours.                 Where to Get Your Medications        These medications were sent to [facility]/pharmacy #[id number] - [address] AT [place] [id number]      Phone: [phone]   acetaminophen 325 mg tablet  labetalol 200 mg tablet  NIFEdipine ER 60 mg 24 hr tablet          Complications Requiring Follow-Up  .LWCOMPLICATIONS[p    Test Results Pending At Discharge  Pending Labs       Order Current Status    Surgical Pathology Exam - [facility]AC[facility]A In process            Outpatient " Follow-Up  No future appointments.    I spent 8 minutes in the professional and overall care of this patient.      Bouchra Martin, APRN-CNP

## 2024-02-16 NOTE — CARE PLAN
The patient's goals for the shift include visit infant in NICU    The clinical goals for the shift include maintain BP  <160/110    Patient remained free from falls/injury throughout shift. VSS on current BP medications. Pain well controlled with scheduled tylenol. Meeting all PP milestones, feels ready to go home. POST-BIRTH warning signs and discharge instructions provided and reviewed in addition to BP monitoring instructions and concerning values to look out for. All questions/concerns addressed at this time.

## 2024-02-20 LAB
LABORATORY COMMENT REPORT: NORMAL
PATH REPORT.FINAL DX SPEC: NORMAL
PATH REPORT.GROSS SPEC: NORMAL
PATH REPORT.RELEVANT HX SPEC: NORMAL
PATH REPORT.TOTAL CANCER: NORMAL
RESIDENT REVIEW: NORMAL

## 2024-02-21 ENCOUNTER — LAB (OUTPATIENT)
Dept: LAB | Facility: LAB | Age: 34
End: 2024-02-21
Payer: COMMERCIAL

## 2024-02-21 LAB
ERYTHROCYTE [DISTWIDTH] IN BLOOD BY AUTOMATED COUNT: 14.1 % (ref 11.5–14.5)
HCT VFR BLD AUTO: 43.2 % (ref 36–46)
HGB BLD-MCNC: 13.7 G/DL (ref 12–16)
MCH RBC QN AUTO: 28.2 PG (ref 26–34)
MCHC RBC AUTO-ENTMCNC: 31.7 G/DL (ref 32–36)
MCV RBC AUTO: 89 FL (ref 80–100)
NRBC BLD-RTO: 0 /100 WBCS (ref 0–0)
PLATELET # BLD AUTO: 354 X10*3/UL (ref 150–450)
RBC # BLD AUTO: 4.85 X10*6/UL (ref 4–5.2)
WBC # BLD AUTO: 10.8 X10*3/UL (ref 4.4–11.3)

## 2024-02-21 PROCEDURE — 85027 COMPLETE CBC AUTOMATED: CPT

## 2024-02-21 PROCEDURE — 36415 COLL VENOUS BLD VENIPUNCTURE: CPT

## 2024-02-27 ENCOUNTER — LACTATION ENCOUNTER (OUTPATIENT)
Dept: OTHER | Facility: HOSPITAL | Age: 34
End: 2024-02-27

## 2024-02-27 NOTE — LACTATION NOTE
This note was copied from a baby's chart.  Lactation Consultant Note  Lactation Consultation   Meg Burr RN IBCLC    Recommendations/Summary       I spoke with parents at pt's bedside.  Mom reports that Airam  was in the NICU and was discharged to home about 1 week ago.  She has been pumping and feeding the baby her milk and formula.  She has no questions currently about how to pump .  She fatigued and worried about the baby.  She was invited to contact LC services as needed.

## 2024-03-05 ENCOUNTER — LACTATION ENCOUNTER (OUTPATIENT)
Dept: OTHER | Facility: HOSPITAL | Age: 34
End: 2024-03-05

## 2024-03-05 NOTE — LACTATION NOTE
This note was copied from a baby's chart.  Lactation Consultant Note  Lactation Consultation   Meg BurrRN IBCLC    Recommendations/Summary       I spoke with parents at pt's bedside to see how pumping and feeding was progressing.  Mom is frustrated as her supply is less than baby's current needs.  She is hoping to get baby back to breast once they are discharged from the hospital.  It has been very difficult to establish nursing when there have been so many disruptions.  Mom was encouraged to continue her pumping schedule as is for now.  If she would like assistance with latching infant before discharge she was invited to reach out to LC services.  I will provide her with Breastfeeding Medicine contact information so that she can follow up with them if her milk supply does not increase once she is home.  I will follow up with mom tomorrow prior to pt leaving.

## 2024-03-06 ENCOUNTER — LACTATION ENCOUNTER (OUTPATIENT)
Dept: OTHER | Facility: HOSPITAL | Age: 34
End: 2024-03-06

## 2024-03-06 NOTE — LACTATION NOTE
This note was copied from a baby's chart.  Lactation Consultant Note  Lactation Consultation   Meg Burr RN IBCLC    Recommendations/Summary       I spoke with mom at pt's bedside to provide her with contact information for out patient Lactation Services.  She has a good feeding plan for Airam and will work on transitioning her to breast feeds once she is home.  She is concerned about her supply and will benefit from out pt support. Mom had no further questions for LC at this time.

## 2024-03-25 ENCOUNTER — LAB REQUISITION (OUTPATIENT)
Dept: LAB | Facility: HOSPITAL | Age: 34
End: 2024-03-25
Payer: COMMERCIAL

## 2024-03-25 DIAGNOSIS — O92.5 SUPPRESSED LACTATION (HHS-HCC): ICD-10-CM

## 2024-03-25 LAB
HOLD SPECIMEN: NORMAL
PROLACTIN SERPL-MCNC: 17.5 UG/L (ref 3–20)
T3FREE SERPL-MCNC: 3.1 PG/ML (ref 2.3–4.2)
T4 FREE SERPL-MCNC: 1.03 NG/DL (ref 0.78–1.48)
TSH SERPL-ACNC: 3.86 MIU/L (ref 0.44–3.98)

## 2024-03-25 PROCEDURE — 84443 ASSAY THYROID STIM HORMONE: CPT

## 2024-03-25 PROCEDURE — 84439 ASSAY OF FREE THYROXINE: CPT

## 2024-03-25 PROCEDURE — 84481 FREE ASSAY (FT-3): CPT

## 2024-03-25 PROCEDURE — 84146 ASSAY OF PROLACTIN: CPT

## 2024-04-12 ENCOUNTER — LAB REQUISITION (OUTPATIENT)
Dept: LAB | Facility: HOSPITAL | Age: 34
End: 2024-04-12
Payer: COMMERCIAL

## 2024-04-12 DIAGNOSIS — O92.5 SUPPRESSED LACTATION (HHS-HCC): ICD-10-CM

## 2024-04-12 LAB — PROLACTIN SERPL-MCNC: 267.7 UG/L (ref 3–20)

## 2024-04-12 PROCEDURE — 84146 ASSAY OF PROLACTIN: CPT

## 2025-03-17 ENCOUNTER — OFFICE VISIT (OUTPATIENT)
Dept: URGENT CARE | Age: 35
End: 2025-03-17
Payer: COMMERCIAL

## 2025-03-17 VITALS
WEIGHT: 293 LBS | SYSTOLIC BLOOD PRESSURE: 120 MMHG | HEIGHT: 64 IN | OXYGEN SATURATION: 98 % | HEART RATE: 90 BPM | RESPIRATION RATE: 20 BRPM | DIASTOLIC BLOOD PRESSURE: 80 MMHG | BODY MASS INDEX: 50.02 KG/M2 | TEMPERATURE: 98.3 F

## 2025-03-17 DIAGNOSIS — R19.7 DIARRHEA, UNSPECIFIED TYPE: Primary | ICD-10-CM

## 2025-03-17 DIAGNOSIS — R10.84 GENERALIZED ABDOMINAL PAIN: ICD-10-CM

## 2025-03-17 LAB
POC APPEARANCE, URINE: CLEAR
POC BILIRUBIN, URINE: NEGATIVE
POC BLOOD, URINE: ABNORMAL
POC COLOR, URINE: YELLOW
POC GLUCOSE, URINE: NEGATIVE MG/DL
POC KETONES, URINE: NEGATIVE MG/DL
POC LEUKOCYTES, URINE: NEGATIVE
POC NITRITE,URINE: NEGATIVE
POC PH, URINE: 6 PH
POC PROTEIN, URINE: NEGATIVE MG/DL
POC SPECIFIC GRAVITY, URINE: >=1.03
POC UROBILINOGEN, URINE: 0.2 EU/DL
PREGNANCY TEST URINE, POC: NEGATIVE

## 2025-03-17 PROCEDURE — 3074F SYST BP LT 130 MM HG: CPT | Performed by: REGISTERED NURSE

## 2025-03-17 PROCEDURE — 99203 OFFICE O/P NEW LOW 30 MIN: CPT | Performed by: REGISTERED NURSE

## 2025-03-17 PROCEDURE — G8433 SCR FOR DEP NOT CPT DOC RSN: HCPCS | Performed by: REGISTERED NURSE

## 2025-03-17 PROCEDURE — 3008F BODY MASS INDEX DOCD: CPT | Performed by: REGISTERED NURSE

## 2025-03-17 PROCEDURE — 81025 URINE PREGNANCY TEST: CPT | Performed by: REGISTERED NURSE

## 2025-03-17 PROCEDURE — 1036F TOBACCO NON-USER: CPT | Performed by: REGISTERED NURSE

## 2025-03-17 PROCEDURE — 81003 URINALYSIS AUTO W/O SCOPE: CPT | Performed by: REGISTERED NURSE

## 2025-03-17 PROCEDURE — 3079F DIAST BP 80-89 MM HG: CPT | Performed by: REGISTERED NURSE

## 2025-03-17 ASSESSMENT — ENCOUNTER SYMPTOMS
DIARRHEA: 1
CHILLS: 0
DYSURIA: 0
FREQUENCY: 0
FLANK PAIN: 0
FEVER: 0
ABDOMINAL PAIN: 1

## 2025-03-17 NOTE — PROGRESS NOTES
Subjective   Patient ID: Nory Rao is a 35 y.o. female. They present today with a chief complaint of Abdominal Pain (Midline abdominal pain with nausea and diarrhea for the past 12 days ).    History of Present Illness    History provided by:  Patient  Abdominal Pain  This is a new problem. The current episode started 1 to 4 weeks ago. The onset quality is gradual. The problem occurs 2 to 4 times per day. The most recent episode lasted 12 days. The problem has been unchanged. The pain is located in the epigastric region. The pain is at a severity of 4/10. The pain is moderate. The quality of the pain is aching and cramping. The abdominal pain does not radiate. Associated symptoms include diarrhea. Pertinent negatives include no dysuria, fever or frequency. The pain is aggravated by eating. The pain is relieved by Nothing. She has tried antacids for the symptoms. The treatment provided no relief.       Past Medical History  Allergies as of 03/17/2025 - Reviewed 03/17/2025   Allergen Reaction Noted    Advil [ibuprofen] Angioedema 02/09/2024    Sudafed [pseudoephedrine hcl] Angioedema 02/09/2024    Vancomycin Angioedema 02/09/2024    Penicillins Rash 02/09/2024       (Not in a hospital admission)       History reviewed. No pertinent past medical history.    History reviewed. No pertinent surgical history.     reports that she has never smoked. She has never used smokeless tobacco.    Review of Systems  Review of Systems   Constitutional:  Negative for chills and fever.   Gastrointestinal:  Positive for abdominal pain and diarrhea.   Genitourinary:  Negative for dysuria, flank pain, frequency and urgency.   All other systems reviewed and are negative.                                 Objective    Vitals:    03/17/25 1019   BP: 120/80   BP Location: Left arm   Patient Position: Sitting   BP Cuff Size: Adult   Pulse: 90   Resp: 20   Temp: 36.8 °C (98.3 °F)   TempSrc: Oral   SpO2: 98%   Weight: 133 kg (294 lb)   Height:  "1.626 m (5' 4\")     No LMP recorded.    Physical Exam  Vitals and nursing note reviewed.   Abdominal:      General: Abdomen is protuberant. Bowel sounds are decreased.      Palpations: Abdomen is soft.      Tenderness: There is abdominal tenderness in the epigastric area.             Procedures    Point of Care Test & Imaging Results from this visit  Results for orders placed or performed in visit on 03/17/25   POCT UA Automated manually resulted   Result Value Ref Range    POC Color, Urine Yellow Straw, Yellow, Light-Yellow    POC Appearance, Urine Clear Clear    POC Glucose, Urine NEGATIVE NEGATIVE mg/dl    POC Bilirubin, Urine NEGATIVE NEGATIVE    POC Ketones, Urine NEGATIVE NEGATIVE mg/dl    POC Specific Gravity, Urine >=1.030 1.005 - 1.035    POC Blood, Urine SMALL (1+) (A) NEGATIVE    POC PH, Urine 6.0 No Reference Range Established PH    POC Protein, Urine NEGATIVE NEGATIVE mg/dl    POC Urobilinogen, Urine 0.2 0.2, 1.0 EU/DL    Poc Nitrite, Urine NEGATIVE NEGATIVE    POC Leukocytes, Urine NEGATIVE NEGATIVE   POCT pregnancy, urine manually resulted   Result Value Ref Range    Preg Test, Ur Negative Negative      No results found.    Diagnostic study results (if any) were reviewed by Crystal L Severino, APRN-CNP.    Assessment/Plan   Allergies, medications, history, and pertinent labs/EKGs/Imaging reviewed by Crystal L Severino, APRN-CNP.     Medical Decision Making  35-year-old female presents with chief complaint of epigastric pain along with diarrhea x 12 days.  Patient states she started the diarrhea 12 days ago and it has continued and seems to come on after every meal.  Patient states she has gone several hours without eating which seems to stop the diarrhea but as soon as she eats anything the diarrhea returns.  She states she is having centralized epigastric pain and gurgling that seems to be present on and off throughout the day.  She states she does have a history of lactose intolerance but has not " been eating any dairy for the last couple of weeks.  States she has tried Imodium and Pepto-Bismol over-the-counter with no results.  She denies any urinary symptoms, denies any blood in her stool, no nausea or vomiting.  Based on patient's presenting symptoms a and with throwing her into GI for further evaluation, patient verbalizes understanding.  At time of discharge patient was clinically well-appearing and HDS for outpatient management. The patient and/or family was educated regarding diagnosis, supportive care, OTC and Rx medications. The patient and/or family was given the opportunity to ask questions prior to discharge.  They verbalized understanding of my discussion of the plans for treatment, expected course, indications to return to  or seek further evaluation in ED, and the need for timely follow up as directed.   They were provided with a work/school excuse if requested.      Orders and Diagnoses  Diagnoses and all orders for this visit:  Diarrhea, unspecified type  -     Referral to Gastroenterology; Future  Generalized abdominal pain  -     POCT UA Automated manually resulted  -     POCT pregnancy, urine manually resulted  -     Referral to Gastroenterology; Future      Medical Admin Record      Patient disposition: Home    Electronically signed by Crystal L Severino, APRN-CNP  10:34 AM

## 2025-03-17 NOTE — PATIENT INSTRUCTIONS
Continue to drink fluids as tolerated  You need to follow up with GI    To schedule your follow up appointment, call [phone], Monday-Friday 7am-11pm and Saturday-Sunday 8am-5pm.  Or you can visit us online at [email or web address] and click on make an appointment for options on scheduling.  [email or web address]

## 2025-03-19 ENCOUNTER — OFFICE VISIT (OUTPATIENT)
Facility: CLINIC | Age: 35
End: 2025-03-19
Payer: COMMERCIAL

## 2025-03-19 VITALS
WEIGHT: 279.2 LBS | HEIGHT: 64 IN | OXYGEN SATURATION: 97 % | BODY MASS INDEX: 47.66 KG/M2 | HEART RATE: 101 BPM | DIASTOLIC BLOOD PRESSURE: 84 MMHG | SYSTOLIC BLOOD PRESSURE: 136 MMHG

## 2025-03-19 DIAGNOSIS — R19.7 DIARRHEA, UNSPECIFIED TYPE: ICD-10-CM

## 2025-03-19 DIAGNOSIS — R10.84 GENERALIZED ABDOMINAL PAIN: ICD-10-CM

## 2025-03-19 LAB — BACTERIA UR CULT: NORMAL

## 2025-03-19 PROCEDURE — 3079F DIAST BP 80-89 MM HG: CPT | Performed by: INTERNAL MEDICINE

## 2025-03-19 PROCEDURE — 3008F BODY MASS INDEX DOCD: CPT | Performed by: INTERNAL MEDICINE

## 2025-03-19 PROCEDURE — 99204 OFFICE O/P NEW MOD 45 MIN: CPT | Performed by: INTERNAL MEDICINE

## 2025-03-19 PROCEDURE — 1036F TOBACCO NON-USER: CPT | Performed by: INTERNAL MEDICINE

## 2025-03-19 PROCEDURE — 3075F SYST BP GE 130 - 139MM HG: CPT | Performed by: INTERNAL MEDICINE

## 2025-03-19 NOTE — PATIENT INSTRUCTIONS
Since your diarrhea is acute related to rule out infectious or inflammatory etiology I also like to obtain routine lab work for further evaluation including a CBC CMP along with celiac serology rule out celiac disease    Will also have you obtain an ultrasound of the gallbladder        If you have any questions please call my office at [phone].          Pending on these results we could possibly move forward with colonoscopy

## 2025-03-19 NOTE — PROGRESS NOTES
[facility] Gastroenterology    ASSESSMENT and PLAN:       Nory Rao is a 35 y.o. female with no significant past medical history  who presents for consultation requested by her primary care provider (No Assigned PCP Generic Provider, MD) for the evaluation of 2 weeks of diarrhea along with abdominal pain.       Acute diarrhea  -Infectious versus viral versus inflammatory versus irritable bowel syndrome  -Patient's only had 2 weeks of ongoing diarrhea, will obtain stool studies rule out inflammation versus infectious etiology  -Will obtain CBC and CMP  -Will also obtain celiac's serology to rule out celiac's disease  -Pending lab work consider colonoscopy at this time will obtain lab work as patient has no alarm symptoms other than the diarrhea that started 2 weeks prior there is no blood in stool    2.  Epigastric pain  -Will obtain right upper quadrant ultrasound rule out biliary etiology            Collin Nova DO         Gastroenterology    [facility]            Subjective   HISTORY OF PRESENT ILLNESS:     Chief Complaint  New Patient Visit (Referred by Crystal Severino for generalized abdominal pain/Patient complains of chronic nausea.  Diarrhea x 2 weeks./)    History Of Present Illness:    Nory Rao is a 35 y.o. female with no significant past medical history  who presents for consultation requested by her primary care provider (No Assigned PCP Generic Provider, MD) for the evaluation of 2 weeks of diarrhea along with abdominal pain.    Patient was seen in urgent care 3/17/2025 for main complaint of epigastric pain along with diarrhea for 12 days.  She states she started having diarrhea 12 days ago and is continued seems to be after every meal.  She states she has a history of lactose intolerance but has not been eating today for last couple weeks.  She has tried Imodium and Pepto-Bismol over-the-counter with no results.      No sick  "contacts at home.  She denies any time spent the woods.  Denies any recent travel other than going to [place] to see her in-laws.  Denies any new or recent medications.  Denies use antibiotics.  Patient works as a  for [employer].  She does not work in healthcare.  Denies any diarrhea 2 months ago.        Patient denies any heartburn/GERD, N/V, dysphagia, odynophagia, abdominal pain, diarrhea, constipation, hematemesis, hematochezia, melena, or weight loss.      Endoscopy History:    Denies previous EGD or colonoscopy    Review of systems:   Review of Systems      I performed a complete 10 point review of systems and it is negative except as noted in HPI or above.        PAST HISTORIES:       Past Medical History:  She has a past medical history of Preeclampsia ([facility]).    Past Surgical History:  She has a past surgical history that includes Back surgery.      Social History:  She reports that she has never smoked. She has never used smokeless tobacco. She reports that she does not drink alcohol and does not use drugs.    Family History:  No known GI disease, specifically denies pancreatitis, Crohn's, colon cancer, gastroesophageal cancer, or ulcerative colitis.    Family History   Problem Relation Name Age of Onset    Rheum arthritis Mother      Other (sarcoma) Father      Other (Unknown GI issues) Brother      Irritable bowel syndrome Brother          Allergies:  Advil [ibuprofen], Sudafed [pseudoephedrine hcl], Vancomycin, and Penicillins        Objective   OBJECTIVE:       Last Recorded Vitals:  Vitals:    03/19/25 0849   BP: 136/84   BP Location: Left arm   Patient Position: Sitting   BP Cuff Size: Large adult   Pulse: 101   SpO2: 97%   Weight: 127 kg (279 lb 3.2 oz)   Height: 1.626 m (5' 4\")     /84 (BP Location: Left arm, Patient Position: Sitting, BP Cuff Size: Large adult)   Pulse 101   Ht 1.626 m (5' 4\")   Wt 127 kg (279 lb 3.2 oz)   SpO2 97%   BMI 47.92 kg/m²  "     Physical Exam:    Physical Exam  Vitals reviewed.   Constitutional:       General: She is awake.      Appearance: Normal appearance. She is obese.   HENT:      Head: Normocephalic.      Mouth/Throat:      Mouth: Mucous membranes are moist.   Eyes:      Extraocular Movements: Extraocular movements intact.   Cardiovascular:      Rate and Rhythm: Normal rate.      Heart sounds: Normal heart sounds.   Pulmonary:      Effort: Pulmonary effort is normal.      Breath sounds: Normal breath sounds.   Abdominal:      General: Bowel sounds are normal.      Palpations: Abdomen is soft.      Tenderness: There is no abdominal tenderness. There is no guarding or rebound.      Hernia: No hernia is present.   Musculoskeletal:         General: Normal range of motion.      Cervical back: Neck supple.   Skin:     General: Skin is warm and dry.   Neurological:      General: No focal deficit present.      Mental Status: She is alert.   Psychiatric:         Attention and Perception: Attention and perception normal.         Mood and Affect: Mood normal.         Behavior: Behavior normal.             Home Medications:  Prior to Admission medications    Medication Sig Start Date End Date Taking? Authorizing Provider   acetaminophen (Tylenol) 325 mg tablet Take 3 tablets (975 mg) by mouth every 6 hours.  Patient not taking: Reported on 3/17/2025 2/16/24   REMIGIO Guy   labetalol (Normodyne) 200 mg tablet Take 4 tablets (800 mg) by mouth every 8 hours.  Patient not taking: Reported on 3/17/2025 2/16/24   REMIGIO Guy   NIFEdipine ER (Adalat CC) 60 mg 24 hr tablet Take 2 tablets (120 mg) by mouth once every 24 hours. Do not crush, chew, or split. Do not start before February 17, 2024.  Patient not taking: Reported on 3/17/2025 2/17/24   REMIGIO Guy         Relevant Results Recent labs reviewed in the EMR.  Lab Results   Component Value Date    HGB 13.7 02/21/2024    HGB 11.5 (L) 02/16/2024    HGB 10.5 (L)  "02/14/2024    MCV 89 02/21/2024    MCV 91 02/16/2024    MCV 89 02/14/2024     02/21/2024     02/16/2024     02/14/2024       No results found for: \"FERRITIN\", \"IRON\"    Lab Results   Component Value Date     02/16/2024    K 4.4 02/16/2024     02/16/2024    BUN 12 02/16/2024    CREATININE 0.78 02/16/2024       Lab Results   Component Value Date    BILITOT 0.4 02/16/2024     Lab Results   Component Value Date    ALT 43 02/16/2024    ALT 55 (H) 02/14/2024    ALT 70 (H) 02/13/2024    AST 24 02/16/2024    AST 33 02/14/2024    AST 52 (H) 02/13/2024    ALKPHOS 118 (H) 02/16/2024    ALKPHOS 115 (H) 02/14/2024    ALKPHOS 137 (H) 02/13/2024       No results found for: \"CRP\"    No results found for: \"CALPS\"    Radiology: Reviewed imaging reviewed in the EMR.  No results found.      "

## 2025-03-20 LAB
ALBUMIN SERPL-MCNC: 4.3 G/DL (ref 3.6–5.1)
ALP SERPL-CCNC: 93 U/L (ref 31–125)
ALT SERPL-CCNC: 101 U/L (ref 6–29)
ANION GAP SERPL CALCULATED.4IONS-SCNC: 10 MMOL/L (CALC) (ref 7–17)
AST SERPL-CCNC: 75 U/L (ref 10–30)
BASOPHILS # BLD AUTO: 0 CELLS/UL (ref 0–200)
BASOPHILS NFR BLD AUTO: 0 %
BILIRUB SERPL-MCNC: 0.6 MG/DL (ref 0.2–1.2)
BUN SERPL-MCNC: 8 MG/DL (ref 7–25)
CALCIUM SERPL-MCNC: 9.4 MG/DL (ref 8.6–10.2)
CHLORIDE SERPL-SCNC: 105 MMOL/L (ref 98–110)
CO2 SERPL-SCNC: 23 MMOL/L (ref 20–32)
CREAT SERPL-MCNC: 0.84 MG/DL (ref 0.5–0.97)
CRP SERPL-MCNC: 6.4 MG/L
EGFRCR SERPLBLD CKD-EPI 2021: 94 ML/MIN/1.73M2
EOSINOPHIL # BLD AUTO: 0 CELLS/UL (ref 15–500)
EOSINOPHIL NFR BLD AUTO: 0 %
ERYTHROCYTE [DISTWIDTH] IN BLOOD BY AUTOMATED COUNT: 14.7 % (ref 11–15)
GLUCOSE SERPL-MCNC: 108 MG/DL (ref 65–99)
HCT VFR BLD AUTO: 44.4 % (ref 35–45)
HGB BLD-MCNC: 14.5 G/DL (ref 11.7–15.5)
LYMPHOCYTES # BLD AUTO: 5022 CELLS/UL (ref 850–3900)
LYMPHOCYTES NFR BLD AUTO: 39 %
MCH RBC QN AUTO: 28.2 PG (ref 27–33)
MCHC RBC AUTO-ENTMCNC: 32.7 G/DL (ref 32–36)
MCV RBC AUTO: 86.2 FL (ref 80–100)
MONOCYTES # BLD AUTO: 558 CELLS/UL (ref 200–950)
MONOCYTES NFR BLD AUTO: 6 %
NEUTROPHILS # BLD AUTO: 3720 CELLS/UL (ref 1500–7800)
NEUTROPHILS NFR BLD AUTO: 40 %
PLATELET # BLD AUTO: 121 THOUSAND/UL (ref 140–400)
PMV BLD REES-ECKER: 11.4 FL (ref 7.5–12.5)
POTASSIUM SERPL-SCNC: 4 MMOL/L (ref 3.5–5.3)
PROT SERPL-MCNC: 7.8 G/DL (ref 6.1–8.1)
RBC # BLD AUTO: 5.15 MILLION/UL (ref 3.8–5.1)
SERVICE CMNT-IMP: ABNORMAL
SODIUM SERPL-SCNC: 138 MMOL/L (ref 135–146)
VARIANT LYMPHS NFR BLD: 15 % (ref 0–10)
WBC # BLD AUTO: 9.3 THOUSAND/UL (ref 3.8–10.8)

## 2025-03-21 ENCOUNTER — TELEPHONE (OUTPATIENT)
Facility: CLINIC | Age: 35
End: 2025-03-21
Payer: COMMERCIAL

## 2025-03-21 ENCOUNTER — HOSPITAL ENCOUNTER (OUTPATIENT)
Dept: RADIOLOGY | Facility: CLINIC | Age: 35
Discharge: HOME | End: 2025-03-21
Payer: COMMERCIAL

## 2025-03-21 DIAGNOSIS — R10.84 GENERALIZED ABDOMINAL PAIN: ICD-10-CM

## 2025-03-21 LAB
IGA SERPL-MCNC: 276 MG/DL (ref 47–310)
TTG IGA SER-ACNC: <1 U/ML

## 2025-03-21 PROCEDURE — 76700 US EXAM ABDOM COMPLETE: CPT

## 2025-03-24 NOTE — TELEPHONE ENCOUNTER
Pt called back and is wondering about her US results along with labs that were done Friday. She is very concerned and would like a call back as soon as possible.     I advised her that Dr. Nova is out of the office this week but we do have another physician covering his message/results and we will call her back as soon as this message is addressed.

## 2025-03-29 LAB
C COLI+JEJUNI+LARI FUSA STL QL NAA+PROBE: NOT DETECTED
C DIFF TOX GENS STL QL NAA+PROBE: NOT DETECTED
CALPROTECTIN STL-MCNT: <5 MCG/G
CRYPTOSP AG STL QL IA: NORMAL
EC STX1 GENE STL QL NAA+PROBE: NOT DETECTED
EC STX2 GENE STL QL NAA+PROBE: NOT DETECTED
ELASTASE PANC STL-MCNT: NORMAL UG/G
G LAMBLIA AG STL QL IA: NORMAL
NOROV GI+II ORF1-ORF2 JNC STL QL NAA+PR: NOT DETECTED
O+P STL TRI STN: NORMAL
RVA NSP5 STL QL NAA+PROBE: NOT DETECTED
SALMONELLA SP RPOD STL QL NAA+PROBE: NOT DETECTED
SHIGELLA DNA SPEC QL NAA+PROBE: NOT DETECTED
V CHOL+PARA RFBL+TRKH+TNAA STL QL NAA+PR: NOT DETECTED
Y ENTERO RECN STL QL NAA+PROBE: NOT DETECTED

## 2025-03-31 ENCOUNTER — TELEPHONE (OUTPATIENT)
Dept: PRIMARY CARE | Facility: CLINIC | Age: 35
End: 2025-03-31
Payer: COMMERCIAL

## 2025-03-31 NOTE — TELEPHONE ENCOUNTER
Ft CPE 4-9-25   Pt states she received medicine on Tuesday from Dr Maloney. Pt states it was called Amoxicillin. Pt states she got sick from it. Pt states she had really bad diarrhea for over two hours this morning.    Pt has been taking Bactrim and seems to have helped. Pt states she was not feeling Pt wishes to speak to nurse.   Please call.

## 2025-04-01 ENCOUNTER — OFFICE VISIT (OUTPATIENT)
Dept: PRIMARY CARE | Facility: CLINIC | Age: 35
End: 2025-04-01
Payer: COMMERCIAL

## 2025-04-01 ENCOUNTER — TELEPHONE (OUTPATIENT)
Dept: PRIMARY CARE | Facility: CLINIC | Age: 35
End: 2025-04-01

## 2025-04-01 VITALS
SYSTOLIC BLOOD PRESSURE: 140 MMHG | HEART RATE: 95 BPM | WEIGHT: 270 LBS | BODY MASS INDEX: 46.1 KG/M2 | HEIGHT: 64 IN | OXYGEN SATURATION: 99 % | DIASTOLIC BLOOD PRESSURE: 88 MMHG

## 2025-04-01 DIAGNOSIS — R74.01 TRANSAMINITIS: ICD-10-CM

## 2025-04-01 DIAGNOSIS — Z00.00 ANNUAL PHYSICAL EXAM: ICD-10-CM

## 2025-04-01 DIAGNOSIS — R16.1 SPLENOMEGALY: Primary | ICD-10-CM

## 2025-04-01 DIAGNOSIS — R53.83 OTHER FATIGUE: ICD-10-CM

## 2025-04-01 PROBLEM — N23 KIDNEY PAIN: Status: RESOLVED | Noted: 2025-04-01 | Resolved: 2025-04-01

## 2025-04-01 PROBLEM — E66.01 MORBID OBESITY (MULTI): Status: RESOLVED | Noted: 2023-04-20 | Resolved: 2025-04-01

## 2025-04-01 PROBLEM — S83.003A PATELLAR SUBLUXATION: Status: RESOLVED | Noted: 2023-05-04 | Resolved: 2025-04-01

## 2025-04-01 PROBLEM — O16.9 HYPERTENSION AFFECTING PREGNANCY (HHS-HCC): Status: ACTIVE | Noted: 2025-04-01

## 2025-04-01 PROBLEM — M25.362 PATELLAR INSTABILITY OF LEFT KNEE: Status: RESOLVED | Noted: 2023-05-04 | Resolved: 2025-04-01

## 2025-04-01 PROBLEM — M25.562 LEFT KNEE PAIN: Status: RESOLVED | Noted: 2023-04-20 | Resolved: 2025-04-01

## 2025-04-01 PROBLEM — M23.90 INTERNAL DERANGEMENT OF KNEE: Status: RESOLVED | Noted: 2023-04-20 | Resolved: 2025-04-01

## 2025-04-01 PROBLEM — S80.00XA PATELLAR CONTUSION: Status: RESOLVED | Noted: 2023-05-04 | Resolved: 2025-04-01

## 2025-04-01 PROBLEM — M65.4 DE QUERVAIN'S TENOSYNOVITIS, LEFT: Status: RESOLVED | Noted: 2025-04-01 | Resolved: 2025-04-01

## 2025-04-01 PROBLEM — O10.919 HTN IN PREGNANCY, CHRONIC (HHS-HCC): Status: ACTIVE | Noted: 2025-04-01

## 2025-04-01 PROBLEM — R10.9 RIGHT FLANK PAIN: Status: RESOLVED | Noted: 2025-04-01 | Resolved: 2025-04-01

## 2025-04-01 PROBLEM — C44.91 BASAL CELL CARCINOMA (BCC): Status: RESOLVED | Noted: 2025-04-01 | Resolved: 2025-04-01

## 2025-04-01 LAB
C COLI+JEJUNI+LARI FUSA STL QL NAA+PROBE: NOT DETECTED
CRYPTOSP AG STL QL IA: NORMAL
EC STX1 GENE STL QL NAA+PROBE: NOT DETECTED
EC STX2 GENE STL QL NAA+PROBE: NOT DETECTED
ELASTASE PANC STL-MCNT: NORMAL UG/G
G LAMBLIA AG STL QL IA: NORMAL
NOROV GI+II ORF1-ORF2 JNC STL QL NAA+PR: NOT DETECTED
O+P STL TRI STN: NORMAL
POC APPEARANCE, URINE: CLEAR
POC BILIRUBIN, URINE: NEGATIVE
POC BLOOD, URINE: NEGATIVE
POC COLOR, URINE: YELLOW
POC GLUCOSE, URINE: NEGATIVE MG/DL
POC KETONES, URINE: NEGATIVE MG/DL
POC LEUKOCYTES, URINE: NEGATIVE
POC NITRITE,URINE: NEGATIVE
POC PH, URINE: 5.5 PH
POC PROTEIN, URINE: NEGATIVE MG/DL
POC SPECIFIC GRAVITY, URINE: 1.02
POC UROBILINOGEN, URINE: 0.2 EU/DL
RVA NSP5 STL QL NAA+PROBE: NOT DETECTED
SALMONELLA SP RPOD STL QL NAA+PROBE: NOT DETECTED
SHIGELLA DNA SPEC QL NAA+PROBE: NOT DETECTED
V CHOL+PARA RFBL+TRKH+TNAA STL QL NAA+PR: NOT DETECTED
Y ENTERO RECN STL QL NAA+PROBE: NOT DETECTED

## 2025-04-01 PROCEDURE — 81003 URINALYSIS AUTO W/O SCOPE: CPT

## 2025-04-01 PROCEDURE — 3008F BODY MASS INDEX DOCD: CPT

## 2025-04-01 PROCEDURE — 1036F TOBACCO NON-USER: CPT

## 2025-04-01 PROCEDURE — 3079F DIAST BP 80-89 MM HG: CPT

## 2025-04-01 PROCEDURE — 3077F SYST BP >= 140 MM HG: CPT

## 2025-04-01 PROCEDURE — 99385 PREV VISIT NEW AGE 18-39: CPT

## 2025-04-01 ASSESSMENT — ENCOUNTER SYMPTOMS
VOMITING: 0
HEADACHES: 0
LIGHT-HEADEDNESS: 0
UNEXPECTED WEIGHT CHANGE: 0
DIZZINESS: 0
FEVER: 0
DIAPHORESIS: 0
PALPITATIONS: 0
CONSTIPATION: 0
NAUSEA: 0
DIARRHEA: 0
SHORTNESS OF BREATH: 0
COUGH: 0
CHILLS: 0
ABDOMINAL PAIN: 0

## 2025-04-01 ASSESSMENT — COLUMBIA-SUICIDE SEVERITY RATING SCALE - C-SSRS: 1. IN THE PAST MONTH, HAVE YOU WISHED YOU WERE DEAD OR WISHED YOU COULD GO TO SLEEP AND NOT WAKE UP?: NO

## 2025-04-01 ASSESSMENT — PATIENT HEALTH QUESTIONNAIRE - PHQ9
1. LITTLE INTEREST OR PLEASURE IN DOING THINGS: NOT AT ALL
SUM OF ALL RESPONSES TO PHQ9 QUESTIONS 1 AND 2: 0
2. FEELING DOWN, DEPRESSED OR HOPELESS: NOT AT ALL

## 2025-04-01 ASSESSMENT — PAIN SCALES - GENERAL: PAINLEVEL_OUTOF10: 0-NO PAIN

## 2025-04-01 NOTE — PROGRESS NOTES
Subjective   Patient ID: Nory Rao is a 35 y.o. female who presents for Annual Exam (Patient is in office today for a yearly exam./- patient states she has been having GI issues since march . She was referred to gastro and wasn't very happy with the doctor. She is concerned about elevated liver enzymes and enlarge spleen. She is having normal BM. No pain today . )    Nory is a 36 yo female presenting for her annual exam. She was recently seen by GI after having 2 weeks of diarrhea. She was noted to have splenomegaly on abd US and elevated LFTs along with fatty liver. She did not have a good experience with GI as they did not return her phone calls, does not want to return there. Diarrhea has since resolved.  Denies: night sweats, unexplained weight loss, fevers, chest pain, SOB, recent sickness, bloating, nausea    Diet and exercise: Not much recently, just walking. Diet: working on conscious healthy choices.   Sleep: awful - has an infant.   Stress: High, father has cancer, has a 1 year old  Dental: last year - needs appointment  Hearing: no concerns  Vision: no concerns   LMP: 3/15/25 regular   Sexual Activity: yes, 1 man.    Occupation: Prosecutor  Family:  2 yo daughter   Skin: has a hx of BCC, sees derm regularly       Patient Care Team:  Rosaura Gomes PA-C as PCP - General (Internal Medicine)    Nory Rao is seen for comprehensive physical exam.  PMH, PSH, family history and social history were reviewed and updated.    Review of Systems   Constitutional:  Negative for chills, diaphoresis, fever and unexpected weight change.   HENT: Negative.  Negative for congestion and hearing loss.    Eyes:  Negative for visual disturbance.   Respiratory:  Negative for cough and shortness of breath.    Cardiovascular:  Negative for chest pain, palpitations and leg swelling.   Gastrointestinal:  Negative for abdominal pain, constipation, diarrhea, nausea and vomiting.   Genitourinary:  Negative  "for menstrual problem.   Neurological:  Negative for dizziness, light-headedness and headaches.   All other systems reviewed and are negative.      Objective   /88   Pulse 95   Ht 1.626 m (5' 4\")   Wt 122 kg (270 lb)   SpO2 99%   Breastfeeding No   BMI 46.35 kg/m²     Physical Exam  Vitals and nursing note reviewed.   Constitutional:       General: She is not in acute distress.     Appearance: Normal appearance. She is not ill-appearing.   Eyes:      Extraocular Movements: Extraocular movements intact.      Conjunctiva/sclera: Conjunctivae normal.   Cardiovascular:      Rate and Rhythm: Normal rate and regular rhythm.      Pulses: Normal pulses.      Heart sounds: Normal heart sounds. No murmur heard.     No friction rub. No gallop.   Pulmonary:      Effort: Pulmonary effort is normal. No respiratory distress.      Breath sounds: Normal breath sounds. No stridor. No wheezing, rhonchi or rales.   Chest:      Chest wall: No tenderness.   Abdominal:      General: There is no distension.      Palpations: Abdomen is soft. There is no mass.      Tenderness: There is no abdominal tenderness. There is no guarding or rebound.      Hernia: No hernia is present.   Musculoskeletal:      Cervical back: Normal range of motion.   Skin:     General: Skin is warm and dry.      Coloration: Skin is not jaundiced.   Neurological:      Mental Status: She is alert and oriented to person, place, and time.   Psychiatric:         Mood and Affect: Mood normal.         Behavior: Behavior normal.         Assessment/Plan   Assessment & Plan  Annual physical exam    Orders:    POCT UA Automated manually resulted    Hemoglobin A1c; Future    Comprehensive metabolic panel; Future    Lipid panel; Future    CBC and Auto Differential; Future    Will check basic lab work and screen for diabetes  Last pap: 1.5 years 8/2023 - follow up in 5 years.   Lifestyle discussed     Splenomegaly    Orders:    CT abdomen pelvis wo IV contrast; Future   "  CT abdomen pelvis w IV contrast; Future    Follow Up In Primary Care - Established; Future    US recommended follow up CT w IV contrast  (Accidentally ordered wo IV contrast - couldn't cancel because test was already scheduled - will send a message tomorrow to make the change)     Could be a result of fatty liver, will recheck lab work     Patient has no warning symptoms. May refer to a different GI if indicated by CT   Body mass index (BMI) 45.0-49.9, adult (Multi)    Patient interested in weight loss medication.   She does not have any current diagnoses that would get it covered by insurance -- will screen for diabetes and sleep apnea     Patient not interested in bariatric surgery at this time    Encouraged healthy diet and exercise     Orders:    Home sleep apnea test (HSAT); Future    Follow Up In Primary Care - Established; Future    Other fatigue  Will check lab work and get a home sleep test    Encouraged sleep hygiene     Orders:    Home sleep apnea test (HSAT); Future    Transaminitis    Possibly related to fatty liver. Will consider referral to a different GI if not improving or if indicated by CT   Advised continued diet and exercise    Orders:    CT abdomen pelvis w IV contrast; Future    Follow Up In Primary Care - Established; Future      Follow up 6 Months, sooner if results come back with abnormalities.

## 2025-04-01 NOTE — TELEPHONE ENCOUNTER
----- Message from Rosaura Gomes sent at 4/1/2025  4:03 PM EDT -----  Regarding: home sleep test  Please let patient know that I researched what we could do to get weight loss meds covered and a diagnosis of sleep apnea would do it -- so I'm ordering a home sleep test to see if that is an option. Just complete it at her earliest convenience

## 2025-04-01 NOTE — TELEPHONE ENCOUNTER
Patient notified. She also states she has high BP and fatty liver and states people she knows with the same insurance take weight loss meds for PCOS and insulin resistance. States her [place] insurance [place].

## 2025-04-03 LAB
ALBUMIN SERPL-MCNC: 4.7 G/DL (ref 3.6–5.1)
ALP SERPL-CCNC: 61 U/L (ref 31–125)
ALT SERPL-CCNC: 74 U/L (ref 6–29)
ANION GAP SERPL CALCULATED.4IONS-SCNC: 7 MMOL/L (CALC) (ref 7–17)
AST SERPL-CCNC: 55 U/L (ref 10–30)
BASOPHILS # BLD AUTO: 47 CELLS/UL (ref 0–200)
BASOPHILS NFR BLD AUTO: 0.6 %
BILIRUB SERPL-MCNC: 0.8 MG/DL (ref 0.2–1.2)
BUN SERPL-MCNC: 14 MG/DL (ref 7–25)
CALCIUM SERPL-MCNC: 10.1 MG/DL (ref 8.6–10.2)
CHLORIDE SERPL-SCNC: 106 MMOL/L (ref 98–110)
CHOLEST SERPL-MCNC: 165 MG/DL
CHOLEST/HDLC SERPL: 5.3 (CALC)
CO2 SERPL-SCNC: 24 MMOL/L (ref 20–32)
CREAT SERPL-MCNC: 0.78 MG/DL (ref 0.5–0.97)
EGFRCR SERPLBLD CKD-EPI 2021: 103 ML/MIN/1.73M2
EOSINOPHIL # BLD AUTO: 62 CELLS/UL (ref 15–500)
EOSINOPHIL NFR BLD AUTO: 0.8 %
ERYTHROCYTE [DISTWIDTH] IN BLOOD BY AUTOMATED COUNT: 14.8 % (ref 11–15)
EST. AVERAGE GLUCOSE BLD GHB EST-MCNC: 111 MG/DL
EST. AVERAGE GLUCOSE BLD GHB EST-SCNC: 6.2 MMOL/L
GLUCOSE SERPL-MCNC: 89 MG/DL (ref 65–99)
HBA1C MFR BLD: 5.5 % OF TOTAL HGB
HCT VFR BLD AUTO: 44.6 % (ref 35–45)
HDLC SERPL-MCNC: 31 MG/DL
HGB BLD-MCNC: 14.4 G/DL (ref 11.7–15.5)
LDLC SERPL CALC-MCNC: 96 MG/DL (CALC)
LYMPHOCYTES # BLD AUTO: 4118 CELLS/UL (ref 850–3900)
LYMPHOCYTES NFR BLD AUTO: 52.8 %
MCH RBC QN AUTO: 28.3 PG (ref 27–33)
MCHC RBC AUTO-ENTMCNC: 32.3 G/DL (ref 32–36)
MCV RBC AUTO: 87.6 FL (ref 80–100)
MONOCYTES # BLD AUTO: 663 CELLS/UL (ref 200–950)
MONOCYTES NFR BLD AUTO: 8.5 %
NEUTROPHILS # BLD AUTO: 2909 CELLS/UL (ref 1500–7800)
NEUTROPHILS NFR BLD AUTO: 37.3 %
NONHDLC SERPL-MCNC: 134 MG/DL (CALC)
PLATELET # BLD AUTO: 180 THOUSAND/UL (ref 140–400)
PMV BLD REES-ECKER: 10.6 FL (ref 7.5–12.5)
POTASSIUM SERPL-SCNC: 4.6 MMOL/L (ref 3.5–5.3)
PROT SERPL-MCNC: 8.5 G/DL (ref 6.1–8.1)
RBC # BLD AUTO: 5.09 MILLION/UL (ref 3.8–5.1)
SODIUM SERPL-SCNC: 137 MMOL/L (ref 135–146)
TRIGL SERPL-MCNC: 267 MG/DL
WBC # BLD AUTO: 7.8 THOUSAND/UL (ref 3.8–10.8)

## 2025-04-09 ENCOUNTER — APPOINTMENT (OUTPATIENT)
Dept: PRIMARY CARE | Facility: CLINIC | Age: 35
End: 2025-04-09
Payer: COMMERCIAL

## 2025-04-09 ENCOUNTER — APPOINTMENT (OUTPATIENT)
Dept: RADIOLOGY | Facility: CLINIC | Age: 35
End: 2025-04-09
Payer: COMMERCIAL

## 2025-04-10 ENCOUNTER — APPOINTMENT (OUTPATIENT)
Dept: RADIOLOGY | Facility: CLINIC | Age: 35
End: 2025-04-10
Payer: COMMERCIAL

## 2025-04-14 ENCOUNTER — TELEPHONE (OUTPATIENT)
Dept: PRIMARY CARE | Facility: CLINIC | Age: 35
End: 2025-04-14
Payer: COMMERCIAL

## 2025-04-14 NOTE — TELEPHONE ENCOUNTER
[facility]  is calling in to let you know that they do not have clearance yet for this patient to have a CT of Abdomen and Pelvis.  Anh was going to call the patient to let her know.

## 2025-04-17 ENCOUNTER — APPOINTMENT (OUTPATIENT)
Dept: RADIOLOGY | Facility: CLINIC | Age: 35
End: 2025-04-17
Payer: COMMERCIAL

## 2025-04-17 ENCOUNTER — HOSPITAL ENCOUNTER (OUTPATIENT)
Dept: RADIOLOGY | Facility: CLINIC | Age: 35
Discharge: HOME | End: 2025-04-17
Payer: COMMERCIAL

## 2025-04-17 DIAGNOSIS — R74.01 TRANSAMINITIS: ICD-10-CM

## 2025-04-17 DIAGNOSIS — R16.1 SPLENOMEGALY: ICD-10-CM

## 2025-04-17 PROCEDURE — 2550000001 HC RX 255 CONTRASTS: Mod: JZ

## 2025-04-17 PROCEDURE — 74177 CT ABD & PELVIS W/CONTRAST: CPT

## 2025-04-17 RX ADMIN — IOHEXOL 75 ML: 350 INJECTION, SOLUTION INTRAVENOUS at 09:42

## 2025-04-23 LAB
C COLI+JEJUNI+LARI FUSA STL QL NAA+PROBE: NOT DETECTED
CRYPTOSP AG STL QL IA: NORMAL
EC STX1 GENE STL QL NAA+PROBE: NOT DETECTED
EC STX2 GENE STL QL NAA+PROBE: NOT DETECTED
ELASTASE PANC STL-MCNT: NORMAL MCG/G
G LAMBLIA AG STL QL IA: NORMAL
NOROV GI+II ORF1-ORF2 JNC STL QL NAA+PR: NOT DETECTED
O+P STL TRI STN: NORMAL
RVA NSP5 STL QL NAA+PROBE: NOT DETECTED
SALMONELLA SP RPOD STL QL NAA+PROBE: NOT DETECTED
SHIGELLA DNA SPEC QL NAA+PROBE: NOT DETECTED
V CHOL+PARA RFBL+TRKH+TNAA STL QL NAA+PR: NOT DETECTED
Y ENTERO RECN STL QL NAA+PROBE: NOT DETECTED

## 2025-04-24 ENCOUNTER — TELEPHONE (OUTPATIENT)
Facility: CLINIC | Age: 35
End: 2025-04-24
Payer: COMMERCIAL

## 2025-04-25 DIAGNOSIS — R16.2 HEPATOSPLENOMEGALY: ICD-10-CM

## 2025-04-25 DIAGNOSIS — R79.89 ELEVATED LIVER FUNCTION TESTS: Primary | ICD-10-CM

## 2025-05-14 ENCOUNTER — PROCEDURE VISIT (OUTPATIENT)
Dept: SLEEP MEDICINE | Facility: HOSPITAL | Age: 35
End: 2025-05-14
Payer: COMMERCIAL

## 2025-05-14 DIAGNOSIS — R53.83 OTHER FATIGUE: ICD-10-CM

## 2025-05-14 NOTE — PROGRESS NOTES
Type of Study: HOME SLEEP STUDY - NOMAD     The patient received equipment and instructions for use of the [facility] [facility] Nomad HSAT device. The patient was instructed how to apply the effort belts, cannula, thermistor. It was also explained how the Nomad and oximeter components work.  The patient was asked to record their sleep for an 8-hour period.     The patient was informed of their responsibility for the device and acknowledged this by signing the HSAT device contract. The patient was asked to return the device on 05/15/2025 between the hours of 0800-0200pm to the [facility].     The patient was instructed to call 911 as usual for any medical- emergencies while at home.  The patient was also given a phone number for troubleshooting when using the device in case there were additional questions.

## 2025-06-10 ENCOUNTER — APPOINTMENT (OUTPATIENT)
Dept: GASTROENTEROLOGY | Facility: CLINIC | Age: 35
End: 2025-06-10
Payer: COMMERCIAL

## 2025-06-10 VITALS
HEART RATE: 101 BPM | BODY MASS INDEX: 45.77 KG/M2 | SYSTOLIC BLOOD PRESSURE: 136 MMHG | HEIGHT: 64 IN | DIASTOLIC BLOOD PRESSURE: 89 MMHG | WEIGHT: 268.1 LBS | OXYGEN SATURATION: 98 %

## 2025-06-10 DIAGNOSIS — K76.0 FATTY LIVER: Primary | ICD-10-CM

## 2025-06-10 DIAGNOSIS — R79.89 ELEVATED LIVER FUNCTION TESTS: ICD-10-CM

## 2025-06-10 DIAGNOSIS — R16.2 HEPATOSPLENOMEGALY: ICD-10-CM

## 2025-06-10 PROCEDURE — 3079F DIAST BP 80-89 MM HG: CPT | Performed by: INTERNAL MEDICINE

## 2025-06-10 PROCEDURE — 99204 OFFICE O/P NEW MOD 45 MIN: CPT | Performed by: INTERNAL MEDICINE

## 2025-06-10 PROCEDURE — 3008F BODY MASS INDEX DOCD: CPT | Performed by: INTERNAL MEDICINE

## 2025-06-10 PROCEDURE — 1036F TOBACCO NON-USER: CPT | Performed by: INTERNAL MEDICINE

## 2025-06-10 PROCEDURE — 99212 OFFICE O/P EST SF 10 MIN: CPT | Performed by: INTERNAL MEDICINE

## 2025-06-10 PROCEDURE — 3075F SYST BP GE 130 - 139MM HG: CPT | Performed by: INTERNAL MEDICINE

## 2025-06-10 ASSESSMENT — ENCOUNTER SYMPTOMS
LOSS OF SENSATION IN FEET: 0
DEPRESSION: 0
OCCASIONAL FEELINGS OF UNSTEADINESS: 0

## 2025-06-10 ASSESSMENT — COLUMBIA-SUICIDE SEVERITY RATING SCALE - C-SSRS
6. HAVE YOU EVER DONE ANYTHING, STARTED TO DO ANYTHING, OR PREPARED TO DO ANYTHING TO END YOUR LIFE?: NO
1. IN THE PAST MONTH, HAVE YOU WISHED YOU WERE DEAD OR WISHED YOU COULD GO TO SLEEP AND NOT WAKE UP?: NO
2. HAVE YOU ACTUALLY HAD ANY THOUGHTS OF KILLING YOURSELF?: NO

## 2025-06-10 ASSESSMENT — PAIN SCALES - GENERAL: PAINLEVEL_OUTOF10: 0-NO PAIN

## 2025-06-10 NOTE — PROGRESS NOTES
HEPATOLOGY NEW PATIENT VISIT    Mitzi 10, 2025    Dr. Rosaura Gomes       Patient Name:   CONNOR RAO  Medical Record Number:  [id number]  YOB: 1990    Dear Dr. Gomes,    I had the pleasure of seeing Connor Rao for consultation in the [facility] ([facility] office). History and physical examination was performed. Pertinent available laboratory, imaging, pathology results were reviewed.     History of Present Illness:   The patient is a 35 year old white female who is referred for evaluation of fatty liver disease, hepatosplenomegaly and elevated liver function tests.    The patient was having issues with diarrhea and abdominal pain.  She was seen by one of the gastroenterologists.  They did additional evaluation and referred her to me for fatty liver disease, hepatosplenomegaly and elevated LFTs.  The workup for diarrhea was unrevealing.    She did have a complicated pregnancy with preeclampsia in 2023/2024.  Her liver enzymes were somewhat elevated at that time.  Other than that, she does not recall being told of any liver disease previously.    The patient has risk factors for fatty liver disease including hyperlipidemia and obesity.  She reports that her weight has been relatively stable for the last few years.  She has never been on therapy for hyperlipidemia.  She denied any past history of diabetes.    The patient denied any past history of acute hepatitis or jaundice.      She has never had any manifestations of liver disease including no jaundice, ascites, hepatic encephalopathy, or variceal bleeding. She denied ever having a liver biopsy.    She is not taking any herbal products, supplements, or prescription medications.    She presented today for evaluation.  She denied any specific liver-related complaints.     Past Medical/Surgical History:   Severe pre-eclampsia complicating childbirth (HHS-HCC)  Elevated liver function tests  Vaginal delivery  (HHS-HCC)  Hypertension affecting pregnancy (HHS-HCC)  HTN in pregnancy, chronic (HHS-HCC)  Fatty liver  Elevated liver function tests    Family History:   There is no known family history of liver disease.  There is no known family history of colon cancer.    Social History:   She is a , working as a .  She denied tobacco use.  She has had no alcohol in the last couple of years.  She was not a big drinker even before that.  She denied blood transfusions.  She denied tattoos.  She denied intravenous drug use.    Review of systems: As noted above.  Her diarrhea and abdominal pain have completely resolved.  No fever, chills, weight loss, visual changes, auditory changes, shortness of breath, chest pain, abdominal pain, GI bleeding, diarrhea, constipation, depression, dysuria, hematuria, musculoskeletal issues, or rash.    Medical, Surgical, Family, and Social Histories  Medical History[1]    Surgical History[2]    Family History[3]    Social History     Socioeconomic History    Marital status:      Spouse name: Not on file    Number of children: Not on file    Years of education: Not on file    Highest education level: Not on file   Occupational History    Not on file   Tobacco Use    Smoking status: Never    Smokeless tobacco: Never   Vaping Use    Vaping status: Never Used   Substance and Sexual Activity    Alcohol use: Never    Drug use: Never    Sexual activity: Defer   Other Topics Concern    Not on file   Social History Narrative    Not on file     Social Drivers of Health     Financial Resource Strain: Low Risk  (2/10/2024)    Overall Financial Resource Strain (CARDIA)     Difficulty of Paying Living Expenses: Not hard at all   Food Insecurity: Not on file   Transportation Needs: No Transportation Needs (2/10/2024)    PRAPARE - Transportation     Lack of Transportation (Medical): No     Lack of Transportation (Non-Medical): No   Physical Activity: Not on file   Stress: Not on file  "  Social Connections: Not on file   Intimate Partner Violence: Not on file   Housing Stability: Not on file       Allergies and Current Medications  Allergies[4]  Current Medications[5]     Physical Exam  /89 (BP Location: Left arm, Patient Position: Sitting, BP Cuff Size: Large adult)   Pulse 101   Ht 1.626 m (5' 4\")   Wt 122 kg (268 lb 1.6 oz)   SpO2 98%   BMI 46.02 kg/m²       Physical Examination:   General Appearance: alert and in no acute distress.   HEENT: oropharynx without lesions. Anicteric sclerae.  No obvious Molina Fleischer rings.  Neck supple, nontender, without adenopathy, thyromegaly, or JVD.   Lungs clear to auscultation and percussion.   Heart RRR without murmurs, rubs, or gallops.   Abdomen: Soft, nontender, bowel sounds positive, without obvious ascites. Liver is not palpable.  She does have splenomegaly, although I cannot assess the exact edge of the spleen.  She is overweight centrally.  Extremities full ROM, no atrophy, normal strength. No edema.   Skin no specific lesions.   Neurological exam nonfocal, alert and oriented.  No asterixis.  No spider angiomata, or palmar erythema.     Labs 4/2/2025 WBC 7.8, hemoglobin 14.4, platelets 180, cholesterol 165, LDL 96, triglycerides 267, hemoglobin A1c 5.5%, glucose 89, sodium 137, creatinine 0.78, protein 8.5, albumin 4.7, alk phos 61, bilirubin 0.8, AST 55, ALT 74.    Labs 3/22/2025 fecal calprotectin negative, C. difficile toxin negative, ova and parasites negative, Cryptosporidium antigen negative, Giardia antigen negative, stool pathogen PCR negative.    Labs 3/19/2025 platelets 121, TTG negative.    Labs 3/25/2024 TSH 3.86.    Labs 2/16/2024 platelets 231.    Labs 1/8/2024 hepatitis B surface antigen negative, hepatitis C antibody negative, bile acids normal at 7, AST 42, ALT 71.    Labs 9/27/2023 AST 29, ALT 47.    Labs 8/31/2023 hemoglobin A1c 5.5%, platelets 184.    CAT scan with contrast 4/17/2025:  IMPRESSION:  1.  " Hepatosplenomegaly and hepatic steatosis as described above. No  additional signs of portal hypertension are visualized, although  Doppler evaluation of the hepatic vasculature is better suited for  evaluation of portal hypertension as clinically warranted.  2. No acute process within the abdomen or pelvis.    Ultrasound 3/21/2025:  IMPRESSION:  1.  Markedly enlarged spleen. Hepatic steatosis. The spleen measures  over 17.5 cm. This does raise the possibility of portal hypertension.  Further evaluation advised with CT imaging.  2. Findings suspicious for gallbladder adenomyomatosis. No evidence  of acute gallbladder disease    Ultrasound 11/20/2014 reported a normal liver.        Assessment/Plan:     1. Fatty liver disease    The patient is referred to me for evaluation of fatty liver disease.    We did speak about fatty liver disease. I explained that the risk factors include diabetes, obesity, hyperlipidemia and alcohol use. I explained that she needs to work on diet, weight loss and exercise.  She also needs to work aggressively with the primary provider about hyperlipidemia and obesity.  I did explain that 20-25% of patients with HOUSTON may proceed to cirrhosis.    I also reminded her that working on these same risk factors could decrease her future risk of heart disease and stroke.    I will start by staging her liver disease with a FibroSCAN test.    She does reportedly have significant splenomegaly.  This can sometimes occur with fatty liver disease.  I will review her CAT scan at the liver radiology tumor board to see if there are any other worrisome features that would warrant additional evaluation or imaging studies.    She has had elevated LFTs for the last few years and intermittently low platelets (although this could relate to pregnancy related issues).  I will do further serologic workup for other causes of liver disease.    To complete the serologic work-up to evaluate for any viral, autoimmune, or  metabolic causes of liver disease, and to evaluate for immunity to hepatitis A and B, I will send off the following lab tests today:   [Antinuclear antibody (BHAVNA)]  [Antimitochondrial antibody (AMA)]  [Smooth muscle antibody (SMA)]  [Ceruloplasmin]  [Iron panel]  [Ferritin]  [Alpha-1-antitrypsin phenotype]  [Hepatitis A total antibody]  [Hepatitis B surface antibody]    She can follow-up with Dr. Nova for her GI needs.    Thank you for allowing me to participate in the care of this patient. Please feel free to contact me with any questions regarding their care.     Sincerely,     Shashank Dorman MD, CAERY, FAASLD, FACG.   Medical Director, Hepatology  Senior Attending Physician  [facility]  [facility]  Professor of Medicine  Division of Gastroenterology and Liver Disease  [employer]  [address] [place] [id number]  Phone: [phone]  Fax: [phone].      cc: Dr. Partha Nova      This document was generated with a computerized dictation system.  Because of this, there could be errors in grammar and/or content.             [1]   Past Medical History:  Diagnosis Date    Basal cell carcinoma (BCC) 04/01/2025    De Quervain's tenosynovitis, left 04/01/2025    Internal derangement of knee 04/20/2023    Kidney pain 04/01/2025    Left knee pain 04/20/2023    Morbid obesity (Multi) 04/20/2023    Patellar contusion 05/04/2023    Patellar instability of left knee 05/04/2023    Patellar subluxation 05/04/2023    Preeclampsia (HHS-HCC)     Right flank pain 04/01/2025   [2]   Past Surgical History:  Procedure Laterality Date    BACK SURGERY     [3]   Family History  Problem Relation Name Age of Onset    Rheum arthritis Mother      Other (sarcoma) Father      Other (Unknown GI issues) Brother      Irritable bowel syndrome Brother     [4]   Allergies  Allergen Reactions    Advil [Ibuprofen] Angioedema     Sudafed [Pseudoephedrine Hcl] Angioedema    Vancomycin Angioedema    Penicillins Rash   [5]   No current outpatient medications on file.     No current facility-administered medications for this visit.

## 2025-06-13 LAB
A1AT PHENOTYP SERPL-IMP: NORMAL
ANA SER QL IF: NEGATIVE
CERULOPLASMIN SERPL-MCNC: 42 MG/DL (ref 14–48)
FERRITIN SERPL-MCNC: 53 NG/ML (ref 16–154)
HAV AB SER QL IA: REACTIVE
HBV SURFACE AB SERPL IA-ACNC: 59 MIU/ML
IRON SATN MFR SERPL: 19 % (CALC) (ref 16–45)
IRON SERPL-MCNC: 64 MCG/DL (ref 40–190)
MITOCHONDRIA AB SER QL IF: NEGATIVE
SMOOTH MUSCLE AB SER QL IF: POSITIVE
SMOOTH MUSCLE AB TITR SER IF: ABNORMAL TITER
TIBC SERPL-MCNC: 342 MCG/DL (CALC) (ref 250–450)

## 2025-06-16 ENCOUNTER — TUMOR BOARD CONFERENCE (OUTPATIENT)
Dept: HEMATOLOGY/ONCOLOGY | Facility: HOSPITAL | Age: 35
End: 2025-06-16
Payer: COMMERCIAL

## 2025-06-16 NOTE — TUMOR BOARD NOTE
HEPATOLOGY ATTENDING UPDATE NOTE    The patient was discussed at liver radiology tumor board today.    The impression was that she has a very large steatotic liver and mild-moderate splenomegaly. There were no specific worrisome focal lesions.    Serologies were negative. She is immune to HAV and HBV.    The recommendation was consider a transjugular liver biopsy with pressures (to rule out underlying disease with the abnormal scan and elevated LFT's) and RTC after the biopsy. If she opts not to do that, she should get US and LFT's and CBC in 6 months and RTC in 7 months.    RUSTAM Dorman.

## 2025-06-17 ENCOUNTER — TELEPHONE (OUTPATIENT)
Dept: GASTROENTEROLOGY | Facility: CLINIC | Age: 35
End: 2025-06-17
Payer: COMMERCIAL

## 2025-06-17 NOTE — TELEPHONE ENCOUNTER
Spoke to patient she is nervous about the possibility of the liver biopsy, supportive listening provided, questions answered. After discussing recommendations plan to proceed with fibroscan and follow up with results.

## 2025-06-17 NOTE — TELEPHONE ENCOUNTER
Called patient to discuss the following results and plan per Dr Dorman:  HEPATOLOGY ATTENDING UPDATE NOTE     The patient was discussed at liver radiology tumor board today.     The impression was that she has a very large steatotic liver and mild-moderate splenomegaly. There were no specific worrisome focal lesions.     Serologies were negative. She is immune to HAV and HBV.     The recommendation was consider a transjugular liver biopsy with pressures (to rule out underlying disease with the abnormal scan and elevated LFT's) and RTC after the biopsy. If she opts not to do that, she should get US and LFT's and CBC in 6 months and RTC in 7 months.     RUSTAM Dorman.

## 2025-10-01 ENCOUNTER — APPOINTMENT (OUTPATIENT)
Dept: PRIMARY CARE | Facility: CLINIC | Age: 35
End: 2025-10-01
Payer: COMMERCIAL